# Patient Record
Sex: FEMALE | Race: WHITE
[De-identification: names, ages, dates, MRNs, and addresses within clinical notes are randomized per-mention and may not be internally consistent; named-entity substitution may affect disease eponyms.]

---

## 2019-01-07 ENCOUNTER — HOSPITAL ENCOUNTER (OUTPATIENT)
Dept: HOSPITAL 92 - SCSULT | Age: 74
Discharge: HOME | End: 2019-01-07
Attending: OTOLARYNGOLOGY
Payer: MEDICARE

## 2019-01-07 DIAGNOSIS — E04.9: ICD-10-CM

## 2019-01-07 DIAGNOSIS — E04.1: Primary | ICD-10-CM

## 2019-01-07 DIAGNOSIS — R53.83: ICD-10-CM

## 2019-01-07 PROCEDURE — 84443 ASSAY THYROID STIM HORMONE: CPT

## 2019-01-07 PROCEDURE — 76536 US EXAM OF HEAD AND NECK: CPT

## 2019-01-07 PROCEDURE — 36415 COLL VENOUS BLD VENIPUNCTURE: CPT

## 2019-01-07 NOTE — ULT
THYROID ULTRASOUND:

 

Date:  01/07/19 

 

CLINICAL HISTORY:  

Thyroid goiter. 

 

FINDINGS:

Left thyroid lobe is absent. The right thyroid lobe measures 3.4 cm in length. There is mild thickeni
ng of the isthmus at 4.0 mm. There is generalized heterogeneity of the thyroid parenchyma. A discrete
 nodule is not identified. 

 

IMPRESSION: 

1.  Evidence of thyroid goiter without a discrete nodule. 

2.  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 

 

 

POS: [facility]

## 2019-01-21 ENCOUNTER — HOSPITAL ENCOUNTER (OUTPATIENT)
Dept: HOSPITAL 92 - LABBT | Age: 74
Discharge: HOME | End: 2019-01-21
Attending: ORTHOPAEDIC SURGERY
Payer: MEDICARE

## 2019-01-21 DIAGNOSIS — Z01.818: Primary | ICD-10-CM

## 2019-01-21 DIAGNOSIS — M17.12: ICD-10-CM

## 2019-01-21 LAB
ANION GAP SERPL CALC-SCNC: 16 MMOL/L (ref 10–20)
APTT PPP: 31.9 SEC (ref 22.9–36.1)
BASOPHILS # BLD AUTO: 0.1 THOU/UL (ref 0–0.2)
BASOPHILS NFR BLD AUTO: 0.7 % (ref 0–1)
BUN SERPL-MCNC: 12 MG/DL (ref 9.8–20.1)
CALCIUM SERPL-MCNC: 9.6 MG/DL (ref 7.8–10.44)
CHLORIDE SERPL-SCNC: 93 MMOL/L (ref 98–107)
CO2 SERPL-SCNC: 29 MMOL/L (ref 23–31)
CREAT CL PREDICTED SERPL C-G-VRATE: 0 ML/MIN (ref 70–130)
CRYSTAL-AUWI FLAG: 0 (ref 0–15)
EOSINOPHIL # BLD AUTO: 0.1 THOU/UL (ref 0–0.7)
EOSINOPHIL NFR BLD AUTO: 1.6 % (ref 0–10)
GLUCOSE SERPL-MCNC: 104 MG/DL (ref 83–110)
HEV IGM SER QL: 0.8 (ref 0–7.99)
HGB BLD-MCNC: 12.1 G/DL (ref 12–16)
HYALINE CASTS #/AREA URNS LPF: (no result) LPF
INR PPP: 1
LYMPHOCYTES # BLD: 1.5 THOU/UL (ref 1.2–3.4)
LYMPHOCYTES NFR BLD AUTO: 16.4 % (ref 21–51)
MCH RBC QN AUTO: 28.1 PG (ref 27–31)
MCV RBC AUTO: 87 FL (ref 78–98)
MONOCYTES # BLD AUTO: 0.7 THOU/UL (ref 0.11–0.59)
MONOCYTES NFR BLD AUTO: 7.9 % (ref 0–10)
NEUTROPHILS # BLD AUTO: 6.7 THOU/UL (ref 1.4–6.5)
NEUTROPHILS NFR BLD AUTO: 73.4 % (ref 42–75)
PATHC CAST-AUWI FLAG: 0.29 (ref 0–2.49)
PLATELET # BLD AUTO: 357 THOU/UL (ref 130–400)
POTASSIUM SERPL-SCNC: 3.1 MMOL/L (ref 3.5–5.1)
PROTHROMBIN TIME: 13.2 SEC (ref 12–14.7)
RBC # BLD AUTO: 4.32 MILL/UL (ref 4.2–5.4)
RBC UR QL AUTO: (no result) HPF (ref 0–3)
SODIUM SERPL-SCNC: 135 MMOL/L (ref 136–145)
SP GR UR STRIP: 1.01 (ref 1–1.04)
SPERM-AUWI FLAG: 0 (ref 0–9.9)
WBC # BLD AUTO: 9.1 THOU/UL (ref 4.8–10.8)
YEAST-AUWI FLAG: 0 (ref 0–25)

## 2019-01-21 PROCEDURE — 85025 COMPLETE CBC W/AUTO DIFF WBC: CPT

## 2019-01-21 PROCEDURE — 85610 PROTHROMBIN TIME: CPT

## 2019-01-21 PROCEDURE — 87081 CULTURE SCREEN ONLY: CPT

## 2019-01-21 PROCEDURE — 80048 BASIC METABOLIC PNL TOTAL CA: CPT

## 2019-01-21 PROCEDURE — 93010 ELECTROCARDIOGRAM REPORT: CPT

## 2019-01-21 PROCEDURE — 85730 THROMBOPLASTIN TIME PARTIAL: CPT

## 2019-01-21 PROCEDURE — 93005 ELECTROCARDIOGRAM TRACING: CPT

## 2019-01-21 PROCEDURE — 71046 X-RAY EXAM CHEST 2 VIEWS: CPT

## 2019-01-21 PROCEDURE — 81001 URINALYSIS AUTO W/SCOPE: CPT

## 2019-01-21 NOTE — RAD
CHEST 2 VIEWS:

 

HISTORY: 

Preadmission radiograph.

 

COMPARISON: 

None.

 

FINDINGS: 

Normal cardiac silhouette.  Pulmonary vessels and hilum are normal.  Costophrenic angles are clear.  
No consolidation or mass.  No pneumothorax or osseous abnormalities.  Incidental hiatal hernia is not
ed.

 

IMPRESSION: 

No acute cardiopulmonary process.

 

POS: [facility]

## 2019-01-29 RX ADMIN — DOCUSATE SODIUM 50 MG AND SENNOSIDES 8.6 MG SCH TAB: 8.6; 5 TABLET, FILM COATED ORAL at 20:30

## 2019-01-29 RX ADMIN — ASPIRIN SCH MG: 81 TABLET ORAL at 20:30

## 2019-01-29 RX ADMIN — CEFAZOLIN SODIUM SCH MLS: 2 SOLUTION INTRAVENOUS at 17:15

## 2019-01-29 NOTE — PDOC.PN
- Subjective


Encounter Start Date: 01/29/19


Encounter Start Time: 19:10


Subjective: Pt seen and examined. chart reviewed.S/P Left TKR


-: IMteam consulted for medical management.PCP 


-: denies any pain,no nausea/vomiting.no fever/chills. no CP/SOB





- Objective


MAR Reviewed: Yes


Vital Signs & Weight: 


 Vital Signs (12 hours)











  Temp Pulse Resp BP BP Pulse Ox


 


 01/29/19 16:45       94 L


 


 01/29/19 16:05     107/69  


 


 01/29/19 16:00   85    130/82 


 


 01/29/19 15:30  97.6 F  91  18   164/81 H  96








 Weight











Weight                         185 lb














I&O: 


 











 01/28/19 01/29/19 01/30/19





 06:59 06:59 06:59


 


Intake Total   1360


 


Output Total   350


 


Balance   1010











Additional Labs: 





 Laboratory Tests











  01/07/19 01/21/19 01/21/19





  11:51 15:05 15:05


 


WBC    9.1


 


Hgb    12.1


 


Plt Count    357


 


Sodium   135 L 


 


Potassium   3.1 L 


 


Chloride   93 L 


 


Carbon Dioxide   29 


 


Anion Gap   16 


 


BUN   12 


 


TSH 3rd Generation  1.7040  














Phys Exam





- Physical Examination


Constitutional: NAD


HEENT: PERRLA, moist MMs, sclera anicteric, oral pharynx no lesions


Neck: no nodes, no JVD, supple, full ROM


Respiratory: no wheezing, no rales, no rhonchi, clear to auscultation bilateral


Cardiovascular: RRR, no significant murmur


Gastrointestinal: soft, non-tender, no distention, positive bowel sounds


Musculoskeletal: no edema, pulses present


Neurological: non-focal, normal sensation, moves all 4 limbs


Psychiatric: normal affect, A&O x 3


Skin: no rash





Dx/Plan


(1) HTN (hypertension)


Code(s): I10 - ESSENTIAL (PRIMARY) HYPERTENSION   Status: Chronic   





(2) Hypothyroid


Code(s): E03.9 - HYPOTHYROIDISM, UNSPECIFIED   Status: Chronic   





(3) Status post total knee replacement, left


Code(s): Z96.652 - PRESENCE OF LEFT ARTIFICIAL KNEE JOINT   Status: Acute   





- Plan


plan discussed w/ family, PT/OT, incentive spirometry, out of bed/ambulate, DVT 

proph w/SCDs


HD stable. cont home meds as below. reviewed.


-: ASA BID for DVT prophylaxis


-: add prn antihypertensives.Bp controlled for now


-: AM labs. monitor lytes,H/H,renal Fx etc


-: IM team will follow





* .








Review of Systems





- Review of Systems


Constitutional: negative: fever, chills, sweats, weakness, malaise, other


ENT: negative: Ear Pain, Ear Discharge, Nose Pain, Nose Discharge, Nose 

Congestion, Mouth Pain, Mouth Swelling, Throat Pain, Throat Swelling, Other


Respiratory: negative: Cough, Dry, Shortness of Breath, Hemoptysis, SOB with 

Excertion, Pleuritic Pain, Sputum, Wheezing


Cardiovascular: negative: chest pain, palpitations, orthopnea, paroxysmal 

nocturnal dyspnea, edema, light headedness, other


Gastrointestinal: negative: Nausea, Vomiting, Abdominal Pain, Diarrhea, 

Constipation, Melena, Hematochezia, Other


Genitourinary: negative: Dysuria, Frequency, Incontinence, Hematuria, Retention

, Other


Musculoskeletal: negative: Neck Pain, Shoulder Pain, Arm Pain, Back Pain, Hand 

Pain, Leg Pain, Foot Pain, Other


Skin: negative: Rash, Lesions, Anupam, Bruising, Other


Neurological: negative: Weakness, Numbness, Incoordination, Change in Speech, 

Confusion, Seizures, Other





- Medications/Allergies


Allergies/Adverse Reactions: 


 Allergies











Allergy/AdvReac Type Severity Reaction Status Date / Time


 


codeine Allergy Mild Headache Verified 01/29/19 16:42


 


Sulfa (Sulfonamide Allergy  Rash Verified 01/29/19 16:42





Antibiotics)     











Medications: 


 Current Medications





Acetaminophen (Tylenol)  1,000 mg PO Q6H PRN


   PRN Reason: Mild Pain (1-3)


Hydrocodone Bitart/Acetaminophen (Norco 10/325)  1 tab PO Q4H PRN


   PRN Reason: Pain (1-3)


Hydrocodone Bitart/Acetaminophen (Norco 10/325)  2 tab PO Q4H PRN


   PRN Reason: PAIN (4-6)


Aspirin (Ecotrin)  81 mg PO BID [facility]


Benzonatate (Tessalon)  100 mg PO Q6H PRN


   PRN Reason: Cough


Clonidine (Catapres)  0.1 mg PO Q4H PRN


   PRN Reason: SBP > 160____


Diphenhydramine HCl (Benadryl)  25 mg PO Q6H PRN


   PRN Reason: Itching


Fentanyl (Sublimaze)  50 mcg IV Q1H PRN


   PRN Reason: BREAKTHRU PAIN


Ferrous Gluconate (Fergon)  324 mg PO BID [facility]


Fluoxetine HCl (Prozac)  10 mg PO DAILY [facility]


Guaifenesin (Robitussin Sf)  200 mg PO Q4H PRN


   PRN Reason: Cough


Hydralazine HCl (Apresoline)  10 mg SLOW IVP Q4H PRN


   PRN Reason: SBP > 170 and HR < 70


Hydrochlorothiazide (Hydrochlorothiazide)  25 mg PO DAILY [facility]


Vancomycin HCl 1.5 gm/ Sodium (Chloride)  300 mls @ 200 mls/hr IVPB ONE [facility]


   Stop: 01/29/19 23:59


Sodium Chloride (Normal Saline 0.9%)  1,000 mls @ 100 mls/hr IV .Q10H [facility]


   Last Admin: 01/29/19 17:14 Dose:  1,000 mls


Ropivacaine 250 ml/ Device  250 mls @ 10 mls/hr NERVE BLCK INF [facility]


Cefazolin Sodium/Dextrose 2 gm (/ Device)  50 mls @ 100 mls/hr IVPB 0200,1000,

1800 [facility]


   Stop: 01/30/19 02:29


   Last Admin: 01/29/19 17:15 Dose:  50 mls


Iron/Minerals/Multivitamins (Theragran M)  1 tab PO DAILY [facility]


Ketorolac Tromethamine (Toradol)  15 mg IVP Q6HR [facility]


   Stop: 01/31/19 06:01


   Last Admin: 01/29/19 17:15 Dose:  15 mg


Levothyroxine Sodium (Synthroid)  112 mcg PO 0600 [facility]


Lisinopril (Zestril)  40 mg PO DAILY [facility]


Nitroglycerin (Nitrostat)  0.4 mg SL Q5MIN PRN


   PRN Reason: Chest Pain


Ondansetron HCl (Zofran)  4 mg IVP Q6H PRN


   PRN Reason: Nausea/Vomiting


Pantoprazole Sodium (Protonix)  40 mg PO HS [facility]


Promethazine HCl (Phenergan)  12.5 mg IM Q4H PRN


   PRN Reason: Nausea


Senna/Docusate Sodium (Senokot S)  2 tab PO BID [facility]


Sodium Chloride (Flush - Normal Saline)  10 ml IVF PRN PRN


   PRN Reason: Saline Flush


Sodium Chloride (Flush - Normal Saline)  10 ml IVF PRN PRN


   PRN Reason: Saline Flush


Tramadol HCl (Ultram)  50 mg PO Q6H PRN


   PRN Reason: Mild Pain (1-3)


Tramadol HCl (Ultram)  100 mg PO Q6H PRN


   PRN Reason: Moderate Pain 4-6


Trospium (Trospium)  20 mg PO DAILY [facility]


Zolpidem Tartrate (Ambien)  5 mg PO HSPRN PRN


   PRN Reason: Insomnia

## 2019-01-29 NOTE — OP
DATE OF PROCEDURE:  01/29/2019



PROCEDURE PERFORMED:  Left total knee arthroplasty using Shilo triathlon, 4 femur,

4 tibia, 9 mm CS X3 polyethylene, and a 29 patella. 



ASSISTANT:  Raúl Morin PA-C.



BLOOD LOSS:  Minimal.



SPECIMEN:  None.



DRAINS:  None.



COMPLICATIONS:  None.



TOURNIQUET TIME:  50 minutes.



PROCEDURE IN DETAIL:  After informed consent was obtained in the preoperative

holding area, the patient was taken to the operative suite where general anesthesia

was induced.  Once adequate level of general anesthesia was obtained, the patient

was positioned and a well-padded tourniquet was placed around the left proximal

thigh.  The left lower extremity was then prepped and draped in the usual sterile

fashion.  Prior to exsanguination, a time-out was called and all members of the

surgical team agreed upon site, surgeon, and patient.  The extremity was then

exsanguinated and the tourniquet was raised.  A midline longitudinal incision was

then made directly over the patella extending 2 fingerbreadths above the superior

pole of the patella and 2 fingerbreadths inferior to the inferior patellar pole of

the patella.  Deeper subcutaneous layers were dissected sharply and local bleeding

was controlled with Bovie electrocautery.  A quad tendon longitudinal split was then

made sharply and a median parapatellar arthrotomy was carried out both sharp and

with Bovie electrocautery, carried down to 1 fingerbreadth medial to the tibial

tubercle.  The knee was then placed into flexion and the patella was everted nicely,

and a copious fat pad ectomy was performed allowing for greater exposure of the

tibia.  The computer-assisted distal femoral fiducial was then placed and pinned

firmly, and the distal femoral cutting guide was pinned firmly into place.  The

oscillating saw was then used to remove the appropriate amount of bone.  The 4-in-1

cutting block was then placed on the distal femur and the oscillating saw was used

to remove the appropriate amount of bone off the anterior, posterior, and chamfer

cuts.  After completion of bone cuts, the anterior cruciate ligament was resected

sharply and the posterior cruciate ligament retractor was placed and the tibia was

subluxed for better exposure.  Partial meniscectomies were carried out, and the

tibial computer-assisted fiducial was pinned, and the cutting guide was placed.

Oscillating saw was then used to remove the bone, with Hohmann retractors used to

take care and protect the collateral ligaments.  After the tibial resection was

performed, a laminar  was placed in between the freshened bone cuts.  The

knee placed at 90 degrees and further bilateral meniscectomies were carried out, and

the curved osteotome and curettage were used to remove any excess bone spurs in the

posterior compartment.  The trial femoral component, tibial baseplate were placed

with the appropriate polyethylene trial insert with an appropriate polyethylene

spacer and patellar button.  The knee was taken through full range of motion with

flexion and extension from 0 to 90 degrees and patellar broach squarely in the

trochlea without any squinting or subluxation noted.  The knee was also stable to

varus and valgus stressing at 0, 15, 45, and 90 degrees of flexion.  The drawer was

negative.  All trial components were then removed and the keel punch was used to

provide the appropriate defect in the tibia with a mallet.  The freshened bone cuts

were copiously irrigated with pulsatile lavage of about 1.5 L to remove all excess

debris.  The freshened bone cuts were then dried with suction and lap sponge.  The

knee was placed in flexion and retractors were placed to provide access to all bone

cuts.  Tobramycin-impregnated methyl methacrylate cement was then placed on the

freshened bone cuts and implants which were malleted firmly into place.  Curettage

and Hagan elevators were used to remove any excess bone cement.  The knee was placed

into full extension and the patellar button was placed under compression, and the

cement was allowed to cure.  Once completed, the components were again taken through

full range of motion and copious irrigation of the knee was carried out with another

liter of normal saline.  All components were inspected fully with full range of

motion and varus and valgus stressing.  There was no laxity noted and full extension

was observed clinically.  Primary closure was accomplished with #2 interrupted

Vicryl stitch of the arthrotomy defect.  This was oversewn with a #2 running Quill

barbed stitch.  The gravitational platelet system was then injected into the

arthrotomy prior to closure.  The subcutaneous layer was then closed with a running

0 barbed Monocryl stitch and skin closure accomplished with a running subcuticular

3-0 Monocryl barbed Quill stitch and augmented with cement on the skin.  Tourniquet

was lowered.  Good spontaneous 

return of distal pulses was noted clinically and a sterile dressing was applied to

the incision.  The procedure was terminated without any complications.  The patient

was awakened in the operative suite and the patient was taken to the recovery room

in stable condition. 







Job ID:  [id number]

## 2019-01-29 NOTE — RAD
LEFT KNEE TWO VIEWS:

 

01/29/2019

 

HISTORY:

Postop total knee replacement.

 

FINDINGS:

Post surgical changes related to left total knee prosthesis are present.  No hardware complication is
 seen.  There is no fracture or dislocation identified.  Subcutaneous emphysema and edema are seen ab
out the knee, related to recent post surgical changes.

 

IMPRESSION:

Recent postsurgical changes related to placement of left total knee prosthesis.

 

POS: [facility]

## 2019-01-30 LAB
ANION GAP SERPL CALC-SCNC: 14 MMOL/L (ref 10–20)
BUN SERPL-MCNC: 12 MG/DL (ref 9.8–20.1)
CALCIUM SERPL-MCNC: 8 MG/DL (ref 7.8–10.44)
CHLORIDE SERPL-SCNC: 97 MMOL/L (ref 98–107)
CO2 SERPL-SCNC: 23 MMOL/L (ref 23–31)
CREAT CL PREDICTED SERPL C-G-VRATE: 82 ML/MIN (ref 70–130)
GLUCOSE SERPL-MCNC: 115 MG/DL (ref 83–110)
HGB BLD-MCNC: 10.2 G/DL (ref 12–16)
MCH RBC QN AUTO: 26.8 PG (ref 27–31)
MCV RBC AUTO: 86.4 FL (ref 78–98)
PLATELET # BLD AUTO: 316 THOU/UL (ref 130–400)
POTASSIUM SERPL-SCNC: 3.1 MMOL/L (ref 3.5–5.1)
RBC # BLD AUTO: 3.8 MILL/UL (ref 4.2–5.4)
SODIUM SERPL-SCNC: 131 MMOL/L (ref 136–145)
WBC # BLD AUTO: 10.9 THOU/UL (ref 4.8–10.8)

## 2019-01-30 RX ADMIN — ROPIVACAINE HYDROCHLORIDE SCH MLS: 2 INJECTION, SOLUTION EPIDURAL; INFILTRATION at 16:10

## 2019-01-30 RX ADMIN — MULTIPLE VITAMINS W/ MINERALS TAB SCH TAB: TAB at 08:55

## 2019-01-30 RX ADMIN — DOCUSATE SODIUM 50 MG AND SENNOSIDES 8.6 MG SCH TAB: 8.6; 5 TABLET, FILM COATED ORAL at 20:42

## 2019-01-30 RX ADMIN — POTASSIUM CHLORIDE SCH MLS: 14.9 INJECTION, SOLUTION INTRAVENOUS at 16:10

## 2019-01-30 RX ADMIN — ASPIRIN SCH MG: 81 TABLET ORAL at 08:55

## 2019-01-30 RX ADMIN — CEFAZOLIN SODIUM SCH MLS: 2 SOLUTION INTRAVENOUS at 01:14

## 2019-01-30 RX ADMIN — POTASSIUM CHLORIDE SCH MLS: 14.9 INJECTION, SOLUTION INTRAVENOUS at 10:36

## 2019-01-30 RX ADMIN — TROSPIUM CHLORIDE SCH MG: 20 TABLET, FILM COATED ORAL at 09:56

## 2019-01-30 RX ADMIN — DOCUSATE SODIUM 50 MG AND SENNOSIDES 8.6 MG SCH TAB: 8.6; 5 TABLET, FILM COATED ORAL at 08:55

## 2019-01-30 RX ADMIN — ASPIRIN SCH MG: 81 TABLET ORAL at 20:41

## 2019-01-30 NOTE — PDOC.PN
- Subjective


Encounter Start Date: 01/30/19


Encounter Start Time: 15:14


Subjective: mild nausea & pain at IV insertion site.IV blew twice 


-: O/W denies any othet complaints.pain controlled





- Objective


MAR Reviewed: Yes


Vital Signs & Weight: 


 Vital Signs (12 hours)











  Temp Pulse Resp BP BP BP Pulse Ox


 


 01/30/19 13:05  98.1 F  80  18    136/72  97


 


 01/30/19 08:55     148/83 H   


 


 01/30/19 07:52  97.7 F  83  20    148/83 H  98


 


 01/30/19 04:32  97.9 F  86  16   135/80   96








 Weight











Admit Weight                   185 lb


 


Weight                         185 lb














I&O: 


 











 01/29/19 01/30/19 01/31/19





 06:59 06:59 06:59


 


Intake Total  1360 100


 


Output Total  350 400


 


Balance  1010 -300











Result Diagrams: 


 01/30/19 05:37





 01/30/19 05:37


Additional Labs: 





 Laboratory Tests











  01/21/19 01/30/19





  15:05 05:37


 


Hgb  12.1  10.2 L














Phys Exam





- Physical Examination


Constitutional: NAD


HEENT: PERRLA, moist MMs, sclera anicteric, oral pharynx no lesions


Neck: no nodes, no JVD, supple, full ROM


Respiratory: no wheezing, no rales, no rhonchi, clear to auscultation bilateral


Cardiovascular: RRR, no significant murmur, no rub


Gastrointestinal: soft, non-tender, no distention, positive bowel sounds


Musculoskeletal: no edema, pulses present


Neurological: non-focal, normal sensation, moves all 4 limbs


Psychiatric: normal affect, A&O x 3


Skin: no rash





Dx/Plan


(1) Hypokalemia


Code(s): E87.6 - HYPOKALEMIA   Status: Acute   





(2) HTN (hypertension)


Code(s): I10 - ESSENTIAL (PRIMARY) HYPERTENSION   Status: Chronic   





(3) Hypothyroid


Code(s): E03.9 - HYPOTHYROIDISM, UNSPECIFIED   Status: Chronic   





(4) Status post total knee replacement, left


Code(s): Z96.652 - PRESENCE OF LEFT ARTIFICIAL KNEE JOINT   Status: Acute   





- Plan


PT/OT, incentive spirometry, out of bed/ambulate, DVT proph w/SCDs


Hemodynamically stable


-: H/H lower post-op.asymptomatic. monitor


-: replace Potassium


-: Pt requesting lad holiday tomorrow d/t IV pain


-: Im team will follow. Home meds as below.OT/PT





* .








Review of Systems





- Review of Systems


Constitutional: weakness.  negative: fever, chills, sweats, malaise, other


ENT: negative: Ear Pain, Ear Discharge, Nose Pain, Nose Discharge, Nose 

Congestion, Mouth Pain, Mouth Swelling, Throat Pain, Throat Swelling, Other


Respiratory: negative: Cough, Dry, Shortness of Breath, Hemoptysis, SOB with 

Excertion, Pleuritic Pain, Sputum, Wheezing


Cardiovascular: negative: chest pain, palpitations, orthopnea, paroxysmal 

nocturnal dyspnea, edema, light headedness, other


Gastrointestinal: Nausea


Genitourinary: negative: Dysuria, Frequency, Incontinence, Hematuria, Retention

, Other


Musculoskeletal: negative: Neck Pain, Shoulder Pain, Arm Pain, Back Pain, Hand 

Pain, Leg Pain, Foot Pain, Other


Neurological: negative: Weakness, Numbness, Incoordination, Change in Speech, 

Confusion, Seizures, Other





- Medications/Allergies


Allergies/Adverse Reactions: 


 Allergies











Allergy/AdvReac Type Severity Reaction Status Date / Time


 


codeine Allergy Mild Headache Verified 01/29/19 16:42


 


Sulfa (Sulfonamide Allergy  Rash Verified 01/29/19 16:42





Antibiotics)     











Medications: 


 Current Medications





Acetaminophen (Tylenol)  1,000 mg PO Q6H PRN


   PRN Reason: Mild Pain (1-3)


Hydrocodone Bitart/Acetaminophen (Norco 10/325)  1 tab PO Q4H PRN


   PRN Reason: Pain (1-3)


Hydrocodone Bitart/Acetaminophen (Norco 10/325)  2 tab PO Q4H PRN


   PRN Reason: PAIN (4-6)


Aspirin (Ecotrin)  81 mg PO BID [facility]


   Last Admin: 01/30/19 08:55 Dose:  81 mg


Benzonatate (Tessalon)  100 mg PO Q6H PRN


   PRN Reason: Cough


Clonidine (Catapres)  0.1 mg PO Q4H PRN


   PRN Reason: SBP > 160____


Diphenhydramine HCl (Benadryl)  25 mg PO Q6H PRN


   PRN Reason: Itching


Fentanyl (Sublimaze)  50 mcg IV Q1H PRN


   PRN Reason: BREAKTHRU PAIN


Ferrous Gluconate (Fergon)  324 mg PO BID [facility]


   Last Admin: 01/30/19 08:55 Dose:  324 mg


Fluoxetine HCl (Prozac)  10 mg PO DAILY [facility]


   Last Admin: 01/30/19 09:56 Dose:  10 mg


Guaifenesin (Robitussin Sf)  200 mg PO Q4H PRN


   PRN Reason: Cough


Hydralazine HCl (Apresoline)  10 mg SLOW IVP Q4H PRN


   PRN Reason: SBP > 170 and HR < 70


Hydrochlorothiazide (Hydrochlorothiazide)  25 mg PO DAILY [facility]


   Last Admin: 01/30/19 08:55 Dose:  25 mg


Sodium Chloride (Normal Saline 0.9%)  1,000 mls @ 100 mls/hr IV .Q10H [facility]


   Last Admin: 01/30/19 07:54 Dose:  Not Given


Ropivacaine 250 ml/ Device  250 mls @ 10 mls/hr NERVE BLCK INF [facility]


Iron/Minerals/Multivitamins (Theragran M)  1 tab PO DAILY [facility]


   Last Admin: 01/30/19 08:55 Dose:  1 tab


Ketorolac Tromethamine (Toradol)  15 mg IVP Q6HR [facility]


   Stop: 01/31/19 06:01


   Last Admin: 01/30/19 13:15 Dose:  15 mg


Levothyroxine Sodium (Synthroid)  112 mcg PO 0600 [facility]


   Last Admin: 01/30/19 06:43 Dose:  112 mcg


Lisinopril (Zestril)  40 mg PO DAILY [facility]


   Last Admin: 01/30/19 08:55 Dose:  40 mg


Nitroglycerin (Nitrostat)  0.4 mg SL Q5MIN PRN


   PRN Reason: Chest Pain


Ondansetron HCl (Zofran)  4 mg IVP Q6H PRN


   PRN Reason: Nausea/Vomiting


Pantoprazole Sodium (Protonix)  40 mg PO HS [facility]


Promethazine HCl (Phenergan)  12.5 mg IM Q4H PRN


   PRN Reason: Nausea


Senna/Docusate Sodium (Senokot S)  2 tab PO BID [facility]


   Last Admin: 01/30/19 08:55 Dose:  2 tab


Sodium Chloride (Flush - Normal Saline)  10 ml IVF PRN PRN


   PRN Reason: Saline Flush


Sodium Chloride (Flush - Normal Saline)  10 ml IVF PRN PRN


   PRN Reason: Saline Flush


Tramadol HCl (Ultram)  50 mg PO Q6H PRN


   PRN Reason: Mild Pain (1-3)


Tramadol HCl (Ultram)  100 mg PO Q6H PRN


   PRN Reason: Moderate Pain 4-6


Trospium (Trospium)  20 mg PO DAILY [facility]


   Last Admin: 01/30/19 09:56 Dose:  20 mg


Zolpidem Tartrate (Ambien)  5 mg PO HSPRN PRN


   PRN Reason: Insomnia

## 2019-01-31 ENCOUNTER — HOSPITAL ENCOUNTER (OUTPATIENT)
Dept: HOSPITAL 92 - SDC | Age: 74
LOS: 1 days | Discharge: HOME | End: 2019-02-01
Attending: ORTHOPAEDIC SURGERY
Payer: MEDICARE

## 2019-01-31 VITALS — BODY MASS INDEX: 37.3 KG/M2

## 2019-01-31 DIAGNOSIS — E87.6: ICD-10-CM

## 2019-01-31 DIAGNOSIS — Z90.722: ICD-10-CM

## 2019-01-31 DIAGNOSIS — Z79.82: ICD-10-CM

## 2019-01-31 DIAGNOSIS — K21.9: ICD-10-CM

## 2019-01-31 DIAGNOSIS — Z88.2: ICD-10-CM

## 2019-01-31 DIAGNOSIS — Z90.710: ICD-10-CM

## 2019-01-31 DIAGNOSIS — E66.9: ICD-10-CM

## 2019-01-31 DIAGNOSIS — F41.9: ICD-10-CM

## 2019-01-31 DIAGNOSIS — F32.9: ICD-10-CM

## 2019-01-31 DIAGNOSIS — M17.12: Primary | ICD-10-CM

## 2019-01-31 DIAGNOSIS — K58.9: ICD-10-CM

## 2019-01-31 DIAGNOSIS — Z98.890: ICD-10-CM

## 2019-01-31 DIAGNOSIS — Z79.899: ICD-10-CM

## 2019-01-31 DIAGNOSIS — E03.9: ICD-10-CM

## 2019-01-31 DIAGNOSIS — Z90.49: ICD-10-CM

## 2019-01-31 DIAGNOSIS — I10: ICD-10-CM

## 2019-01-31 DIAGNOSIS — Z88.5: ICD-10-CM

## 2019-01-31 PROCEDURE — 20985 CPTR-ASST DIR MS PX: CPT

## 2019-01-31 PROCEDURE — 97116 GAIT TRAINING THERAPY: CPT

## 2019-01-31 PROCEDURE — 0SRD0J9 REPLACEMENT OF LEFT KNEE JOINT WITH SYNTHETIC SUBSTITUTE, CEMENTED, OPEN APPROACH: ICD-10-PCS | Performed by: ORTHOPAEDIC SURGERY

## 2019-01-31 PROCEDURE — 98961 EDU&TRN PT SLF-MGMT NQHP 2-4: CPT

## 2019-01-31 PROCEDURE — 97110 THERAPEUTIC EXERCISES: CPT

## 2019-01-31 PROCEDURE — C1776 JOINT DEVICE (IMPLANTABLE): HCPCS

## 2019-01-31 PROCEDURE — 8E0YXBZ COMPUTER ASSISTED PROCEDURE OF LOWER EXTREMITY: ICD-10-PCS | Performed by: ORTHOPAEDIC SURGERY

## 2019-01-31 PROCEDURE — C1713 ANCHOR/SCREW BN/BN,TIS/BN: HCPCS

## 2019-01-31 PROCEDURE — 36415 COLL VENOUS BLD VENIPUNCTURE: CPT

## 2019-01-31 PROCEDURE — 97530 THERAPEUTIC ACTIVITIES: CPT

## 2019-01-31 PROCEDURE — 85027 COMPLETE CBC AUTOMATED: CPT

## 2019-01-31 PROCEDURE — 80048 BASIC METABOLIC PNL TOTAL CA: CPT

## 2019-01-31 PROCEDURE — 85025 COMPLETE CBC W/AUTO DIFF WBC: CPT

## 2019-01-31 PROCEDURE — 73560 X-RAY EXAM OF KNEE 1 OR 2: CPT

## 2019-01-31 PROCEDURE — 27447 TOTAL KNEE ARTHROPLASTY: CPT

## 2019-01-31 PROCEDURE — 97139 UNLISTED THERAPEUTIC PX: CPT

## 2019-01-31 PROCEDURE — 97150 GROUP THERAPEUTIC PROCEDURES: CPT

## 2019-01-31 RX ADMIN — DOCUSATE SODIUM 50 MG AND SENNOSIDES 8.6 MG SCH: 8.6; 5 TABLET, FILM COATED ORAL at 22:01

## 2019-01-31 RX ADMIN — ASPIRIN SCH MG: 81 TABLET ORAL at 22:00

## 2019-01-31 RX ADMIN — TROSPIUM CHLORIDE SCH MG: 20 TABLET, FILM COATED ORAL at 11:38

## 2019-01-31 RX ADMIN — ASPIRIN SCH MG: 81 TABLET ORAL at 09:52

## 2019-01-31 RX ADMIN — MULTIPLE VITAMINS W/ MINERALS TAB SCH TAB: TAB at 09:52

## 2019-01-31 RX ADMIN — ROPIVACAINE HYDROCHLORIDE SCH MLS: 2 INJECTION, SOLUTION EPIDURAL; INFILTRATION at 18:57

## 2019-01-31 RX ADMIN — DOCUSATE SODIUM 50 MG AND SENNOSIDES 8.6 MG SCH: 8.6; 5 TABLET, FILM COATED ORAL at 09:54

## 2019-01-31 NOTE — PDOC.PN
- Subjective


Encounter Start Date: 01/31/19


Encounter Start Time: 14:04


Subjective: had episode of dizziness and SOB earlier this Morning walking to [place]


-: better now





- Objective


MAR Reviewed: Yes


Vital Signs & Weight: 


 Vital Signs (12 hours)











  Temp Pulse Resp BP BP BP Pulse Ox


 


 01/31/19 11:56  97.2 F L  78  16    139/82  95


 


 01/31/19 11:36     139/82   


 


 01/31/19 09:20       


 


 01/31/19 07:28  98 F  86  20   139/82   94 L


 


 01/31/19 04:34  98.2 F   20    118/57 L 


 


 01/31/19 03:30        96














  Pulse Ox


 


 01/31/19 11:56 


 


 01/31/19 11:36 


 


 01/31/19 09:20  96


 


 01/31/19 07:28 


 


 01/31/19 04:34 


 


 01/31/19 03:30 








 Weight











Admit Weight                   185 lb


 


Weight                         185 lb














I&O: 


 











 01/30/19 01/31/19 02/01/19





 06:59 06:59 06:59


 


Intake Total 1360 2220 500


 


Output Total 350 400 


 


Balance 1010 1820 500











Result Diagrams: 


 01/30/19 05:37





 01/30/19 05:37





Phys Exam





- Physical Examination


Constitutional: NAD


sitting up in chair


HEENT: PERRLA, moist MMs, sclera anicteric, oral pharynx no lesions


Neck: no nodes, no JVD, supple, full ROM


Respiratory: no wheezing, no rales, no rhonchi


Cardiovascular: RRR, no significant murmur


Gastrointestinal: soft, non-tender, no distention, positive bowel sounds


Musculoskeletal: no edema, pulses present


Neurological: non-focal, normal sensation, moves all 4 limbs


Psychiatric: normal affect, A&O x 3


Skin: no rash





Dx/Plan


(1) Hypokalemia


Code(s): E87.6 - HYPOKALEMIA   Status: Acute   Comment: recheck in am   





(2) HTN (hypertension)


Code(s): I10 - ESSENTIAL (PRIMARY) HYPERTENSION   Status: Chronic   





(3) Hypothyroid


Code(s): E03.9 - HYPOTHYROIDISM, UNSPECIFIED   Status: Chronic   





(4) Status post total knee replacement, left


Code(s): Z96.652 - PRESENCE OF LEFT ARTIFICIAL KNEE JOINT   Status: Acute   





- Plan


DVT proph w/SCDs


BP on lower side.Hold HCTZ.Add parameters on lisinopril


-: jaciel vasovagal


-: will check H/H tomorrow.Cont FeSO4 BID.


-: ASA BID for dvt prophylaxis.


-: IM team will follow.





* .








Review of Systems





- Review of Systems


Constitutional: weakness.  negative: fever, chills, sweats, malaise, other


Respiratory: negative: Cough, Dry, Shortness of Breath, Hemoptysis, SOB with 

Excertion, Pleuritic Pain, Sputum, Wheezing


Cardiovascular: negative: chest pain, palpitations, orthopnea, paroxysmal 

nocturnal dyspnea, edema, light headedness, other


Gastrointestinal: negative: Nausea, Vomiting, Abdominal Pain, Diarrhea, 

Constipation, Melena, Hematochezia, Other


Genitourinary: negative: Dysuria, Frequency, Incontinence, Hematuria, Retention

, Other


Musculoskeletal: negative: Neck Pain, Shoulder Pain, Arm Pain, Back Pain, Hand 

Pain, Leg Pain, Foot Pain, Other





- Medications/Allergies


Allergies/Adverse Reactions: 


 Allergies











Allergy/AdvReac Type Severity Reaction Status Date / Time


 


codeine Allergy Mild Headache Verified 01/29/19 16:42


 


Sulfa (Sulfonamide Allergy  Rash Verified 01/29/19 16:42





Antibiotics)     











Medications: 


 Current Medications





Acetaminophen (Tylenol)  1,000 mg PO Q6H PRN


   PRN Reason: Mild Pain (1-3)


Hydrocodone Bitart/Acetaminophen (Norco 10/325)  1 tab PO Q4H PRN


   PRN Reason: Pain (1-3)


Hydrocodone Bitart/Acetaminophen (Norco 10/325)  2 tab PO Q4H PRN


   PRN Reason: PAIN (4-6)


Aspirin (Ecotrin)  81 mg PO BID [facility]


   Last Admin: 01/31/19 09:52 Dose:  81 mg


Benzonatate (Tessalon)  100 mg PO Q6H PRN


   PRN Reason: Cough


Clonidine (Catapres)  0.1 mg PO Q4H PRN


   PRN Reason: SBP > 160____


Diphenhydramine HCl (Benadryl)  25 mg PO Q6H PRN


   PRN Reason: Itching


Fentanyl (Sublimaze)  50 mcg IV Q1H PRN


   PRN Reason: BREAKTHRU PAIN


Ferrous Gluconate (Fergon)  324 mg PO BID [facility]


   Last Admin: 01/31/19 09:52 Dose:  324 mg


Fluoxetine HCl (Prozac)  10 mg PO DAILY [facility]


   Last Admin: 01/31/19 11:37 Dose:  10 mg


Guaifenesin (Robitussin Sf)  200 mg PO Q4H PRN


   PRN Reason: Cough


Hydralazine HCl (Apresoline)  10 mg SLOW IVP Q4H PRN


   PRN Reason: SBP > 170 and HR < 70


Sodium Chloride (Normal Saline 0.9%)  1,000 mls @ 100 mls/hr IV .Q10H [facility]


   Last Admin: 01/31/19 08:35 Dose:  Not Given


Ropivacaine 250 ml/ Device  250 mls @ 10 mls/hr NERVE BLCK INF [facility]


   Last Admin: 01/30/19 16:10 Dose:  250 mls


Iron/Minerals/Multivitamins (Theragran M)  1 tab PO DAILY [facility]


   Last Admin: 01/31/19 09:52 Dose:  1 tab


Levothyroxine Sodium (Synthroid)  112 mcg PO 0600 [facility]


   Last Admin: 01/31/19 06:34 Dose:  112 mcg


Lisinopril (Zestril)  40 mg PO DAILY [facility]


Nitroglycerin (Nitrostat)  0.4 mg SL Q5MIN PRN


   PRN Reason: Chest Pain


Ondansetron HCl (Zofran)  4 mg IVP Q6H PRN


   PRN Reason: Nausea/Vomiting


   Last Admin: 01/30/19 23:11 Dose:  4 mg


Pantoprazole Sodium (Protonix)  40 mg PO HS [facility]


   Last Admin: 01/30/19 20:42 Dose:  40 mg


Promethazine HCl (Phenergan)  12.5 mg IM Q4H PRN


   PRN Reason: Nausea


Senna/Docusate Sodium (Senokot S)  2 tab PO BID [facility]


   Last Admin: 01/31/19 09:54 Dose:  Not Given


Sodium Chloride (Flush - Normal Saline)  10 ml IVF PRN PRN


   PRN Reason: Saline Flush


Sodium Chloride (Flush - Normal Saline)  10 ml IVF PRN PRN


   PRN Reason: Saline Flush


Tramadol HCl (Ultram)  50 mg PO Q6H PRN


   PRN Reason: Mild Pain (1-3)


Tramadol HCl (Ultram)  100 mg PO Q6H PRN


   PRN Reason: Moderate Pain 4-6


Trospium (Trospium)  20 mg PO DAILY [facility]


   Last Admin: 01/31/19 11:38 Dose:  20 mg


Zolpidem Tartrate (Ambien)  5 mg PO HSPRN PRN


   PRN Reason: Insomnia

## 2019-02-01 VITALS — DIASTOLIC BLOOD PRESSURE: 85 MMHG | SYSTOLIC BLOOD PRESSURE: 148 MMHG | TEMPERATURE: 98.1 F

## 2019-02-01 LAB
ANION GAP SERPL CALC-SCNC: 13 MMOL/L (ref 10–20)
BASOPHILS # BLD AUTO: 0 THOU/UL (ref 0–0.2)
BASOPHILS NFR BLD AUTO: 0.5 % (ref 0–1)
BUN SERPL-MCNC: 8 MG/DL (ref 9.8–20.1)
CALCIUM SERPL-MCNC: 8.3 MG/DL (ref 7.8–10.44)
CHLORIDE SERPL-SCNC: 97 MMOL/L (ref 98–107)
CO2 SERPL-SCNC: 25 MMOL/L (ref 23–31)
CREAT CL PREDICTED SERPL C-G-VRATE: 92 ML/MIN (ref 70–130)
EOSINOPHIL # BLD AUTO: 0.3 THOU/UL (ref 0–0.7)
EOSINOPHIL NFR BLD AUTO: 3.7 % (ref 0–10)
GLUCOSE SERPL-MCNC: 91 MG/DL (ref 83–110)
HGB BLD-MCNC: 9.8 G/DL (ref 12–16)
LYMPHOCYTES # BLD: 1.2 THOU/UL (ref 1.2–3.4)
LYMPHOCYTES NFR BLD AUTO: 12.6 % (ref 21–51)
MCH RBC QN AUTO: 28.4 PG (ref 27–31)
MCV RBC AUTO: 88.6 FL (ref 78–98)
MONOCYTES # BLD AUTO: 1.1 THOU/UL (ref 0.11–0.59)
MONOCYTES NFR BLD AUTO: 11.2 % (ref 0–10)
NEUTROPHILS # BLD AUTO: 6.8 THOU/UL (ref 1.4–6.5)
NEUTROPHILS NFR BLD AUTO: 72.1 % (ref 42–75)
PLATELET # BLD AUTO: 263 THOU/UL (ref 130–400)
POTASSIUM SERPL-SCNC: 3.4 MMOL/L (ref 3.5–5.1)
RBC # BLD AUTO: 3.44 MILL/UL (ref 4.2–5.4)
SODIUM SERPL-SCNC: 132 MMOL/L (ref 136–145)
WBC # BLD AUTO: 9.4 THOU/UL (ref 4.8–10.8)

## 2019-02-01 RX ADMIN — ASPIRIN SCH MG: 81 TABLET ORAL at 08:57

## 2019-02-01 RX ADMIN — MULTIPLE VITAMINS W/ MINERALS TAB SCH TAB: TAB at 08:57

## 2019-02-01 RX ADMIN — DOCUSATE SODIUM 50 MG AND SENNOSIDES 8.6 MG SCH TAB: 8.6; 5 TABLET, FILM COATED ORAL at 08:56

## 2019-02-01 NOTE — PDOC.PN
- Subjective


Encounter Start Date: 02/01/19


Encounter Start Time: 13:43


Subjective: pt seen and examined.


-: feels a little dizzy but better.no ON events





- Objective


MAR Reviewed: Yes


Vital Signs & Weight: 


 Vital Signs (12 hours)











  Temp Pulse Resp BP BP Pulse Ox


 


 02/01/19 11:46  98.1 F  85  20   148/85 H  96


 


 02/01/19 08:56     160/86 H  


 


 02/01/19 08:00  97.9 F  98  16   160/86 H  95


 


 02/01/19 04:27  98.3 F  84  18   162/82 H  94 L








 Weight











Admit Weight                   185 lb


 


Weight                         185 lb














I&O: 


 











 01/31/19 02/01/19 02/02/19





 06:59 06:59 06:59


 


Intake Total 2220 1220 


 


Output Total 400  


 


Balance 1820 1220 











Result Diagrams: 


 02/01/19 06:18





 02/01/19 06:18





Phys Exam





- Physical Examination


Constitutional: NAD


HEENT: PERRLA, moist MMs, sclera anicteric, oral pharynx no lesions


Neck: no nodes, no JVD, supple, full ROM


Respiratory: no wheezing, no rales, no rhonchi, clear to auscultation bilateral


Cardiovascular: RRR, no significant murmur


Gastrointestinal: soft, non-tender, no distention, positive bowel sounds


Musculoskeletal: no edema, pulses present


Neurological: non-focal, normal sensation, moves all 4 limbs


Psychiatric: normal affect, A&O x 3





Dx/Plan


(1) Hypokalemia


Code(s): E87.6 - HYPOKALEMIA   Status: Acute   Comment: recheck as an OP.Pt 

educated   





(2) HTN (hypertension)


Code(s): I10 - ESSENTIAL (PRIMARY) HYPERTENSION   Status: Chronic   





(3) Hypothyroid


Code(s): E03.9 - HYPOTHYROIDISM, UNSPECIFIED   Status: Chronic   





(4) Status post total knee replacement, left


Code(s): Z96.652 - PRESENCE OF LEFT ARTIFICIAL KNEE JOINT   Status: Acute   





- Plan


OK to DC


-: HD stable





* .








Review of Systems





- Review of Systems


Constitutional: weakness.  negative: fever, chills, sweats, malaise, other


ENT: negative: Ear Pain, Ear Discharge, Nose Pain, Nose Discharge, Nose 

Congestion, Mouth Pain, Mouth Swelling, Throat Pain, Throat Swelling, Other


Respiratory: negative: Cough, Dry, Shortness of Breath, Hemoptysis, SOB with 

Excertion, Pleuritic Pain, Sputum, Wheezing


Cardiovascular: negative: chest pain, palpitations, orthopnea, paroxysmal 

nocturnal dyspnea, edema, light headedness, other


Gastrointestinal: negative: Nausea, Vomiting, Abdominal Pain, Diarrhea, 

Constipation, Melena, Hematochezia, Other


Genitourinary: negative: Dysuria, Frequency, Incontinence, Hematuria, Retention

, Other


Neurological: negative: Weakness, Numbness, Incoordination, Change in Speech, 

Confusion, Seizures, Other





- Medications/Allergies


Allergies/Adverse Reactions: 


 Allergies











Allergy/AdvReac Type Severity Reaction Status Date / Time


 


codeine Allergy Mild Headache Verified 01/29/19 16:42


 


Sulfa (Sulfonamide Allergy  Rash Verified 01/29/19 16:42





Antibiotics)

## 2019-06-07 ENCOUNTER — HOSPITAL ENCOUNTER (OUTPATIENT)
Dept: HOSPITAL 92 - SCSULT | Age: 74
Discharge: HOME | End: 2019-06-07
Attending: UROLOGY
Payer: MEDICARE

## 2019-06-07 DIAGNOSIS — N39.0: Primary | ICD-10-CM

## 2019-06-07 DIAGNOSIS — R39.15: ICD-10-CM

## 2019-06-07 DIAGNOSIS — K76.0: ICD-10-CM

## 2019-06-07 PROCEDURE — 76770 US EXAM ABDO BACK WALL COMP: CPT

## 2019-06-07 NOTE — ULT
US Renal Bilateral STANDARD



History: [Urinary urgency]



Comparison: Ultrasound 2011



Findings: Real-time grayscale and color evaluation of the kidneys and urinary bladder was performed.



Right kidney measures 10.8 x 4.6 x 4.7 cm and the left kidney measures 10.2 x 4.6 x 5 cm. No renal ma
ss, hydronephrosis, or abnormal calcifications. Urinary bladder is are unremarkable.



Diffuse hepatic steatosis.





Prevoid urinary bladder volume is 68 mL in post void urinary bladder volume is 6.4 mL area





Impression: No renal mass, hydronephrosis, or abnormal calcifications. No evidence for obstructive ur
opathy.



Diffuse hepatic steatosis.



Reported By: Chris Leblanc 

Electronically Signed:  6/7/2019 2:29 PM

## 2020-09-24 ENCOUNTER — HOSPITAL ENCOUNTER (OUTPATIENT)
Dept: HOSPITAL 92 - SCSCT | Age: 75
Discharge: HOME | End: 2020-09-24
Attending: INTERNAL MEDICINE
Payer: MEDICARE

## 2020-09-24 DIAGNOSIS — R91.8: Primary | ICD-10-CM

## 2020-09-24 DIAGNOSIS — K44.9: ICD-10-CM

## 2020-09-24 LAB — ESTIMATED GFR-MDRD - POC: 70

## 2020-09-24 PROCEDURE — 82565 ASSAY OF CREATININE: CPT

## 2020-09-24 PROCEDURE — 71260 CT THORAX DX C+: CPT

## 2020-09-29 NOTE — CT
Exam: Chest CT with contrast



HISTORY: Shortness of breath, hypertension. Lung mass.



COMPARISON: None

Correlation: Chest radiograph 1/21/2019



FINDINGS:

Mediastinum: Nonenlarged mediastinal lymph nodes. Subcarinal lymph node measures 1.3 x 0.9 cm.



Heart: No evidence of cardiomegaly, coronary calcifications are pericardial fluid

Aorta: No evidence of aneurysm, dissection or periaortic fat stranding

Lower neck and axilla: Left thyroid lobe appears to be surgically absent. Nonspecific bilateral axill
amanda lymphadenopathy

Subdiaphragmatic structures: Hepatic steatosis. Moderate hiatal hernia

Osseous structures: No lytic or blastic lesions within the osseous structures.

Trachea and central bronchi: Patent

Pleural spaces: No pleural effusion

Pneumothorax: None



LUNGS: Minimal dependent atelectatic changes

Nodules: No suspicious masses or nodules in the right or left lung.



IMPRESSION:

1. No evidence of a suspicious masses or nodules in the left or right lung.

2. Moderate hiatal hernia projecting posterior to the left atrium and left ventricle.

Results study discussed with Dr. Negrete 9/29/2020 at 1:48 PM



Reported By: Nelida Colmenares 

Electronically Signed:  9/29/2020 1:49 PM

## 2021-03-01 ENCOUNTER — HOSPITAL ENCOUNTER (OUTPATIENT)
Dept: HOSPITAL 92 - BICRAD | Age: 76
Discharge: HOME | End: 2021-03-01
Attending: INTERNAL MEDICINE
Payer: MEDICARE

## 2021-03-01 DIAGNOSIS — R06.00: Primary | ICD-10-CM

## 2021-03-01 PROCEDURE — 71046 X-RAY EXAM CHEST 2 VIEWS: CPT

## 2021-03-11 ENCOUNTER — HOSPITAL ENCOUNTER (OUTPATIENT)
Dept: HOSPITAL 92 - BICULT | Age: 76
Discharge: HOME | End: 2021-03-11
Attending: INTERNAL MEDICINE
Payer: MEDICARE

## 2021-03-11 DIAGNOSIS — I10: Primary | ICD-10-CM

## 2021-03-11 PROCEDURE — 76770 US EXAM ABDO BACK WALL COMP: CPT

## 2021-03-11 PROCEDURE — 93975 VASCULAR STUDY: CPT

## 2021-03-12 ENCOUNTER — HOSPITAL ENCOUNTER (OUTPATIENT)
Dept: HOSPITAL 92 - SLEEPLAB | Age: 76
Discharge: HOME | End: 2021-03-12
Attending: INTERNAL MEDICINE
Payer: MEDICARE

## 2021-03-12 DIAGNOSIS — R06.83: ICD-10-CM

## 2021-03-12 DIAGNOSIS — R53.83: ICD-10-CM

## 2021-03-12 DIAGNOSIS — G47.33: Primary | ICD-10-CM

## 2021-03-12 DIAGNOSIS — I10: ICD-10-CM

## 2021-03-12 DIAGNOSIS — R09.02: ICD-10-CM

## 2021-03-12 DIAGNOSIS — G47.00: ICD-10-CM

## 2021-03-12 PROCEDURE — 95806 SLEEP STUDY UNATT&RESP EFFT: CPT

## 2021-03-30 ENCOUNTER — HOSPITAL ENCOUNTER (EMERGENCY)
Dept: HOSPITAL 92 - CSHERS | Age: 76
Discharge: HOME | End: 2021-03-30
Payer: MEDICARE

## 2021-03-30 DIAGNOSIS — I10: ICD-10-CM

## 2021-03-30 DIAGNOSIS — E66.9: ICD-10-CM

## 2021-03-30 DIAGNOSIS — K35.80: Primary | ICD-10-CM

## 2021-03-30 DIAGNOSIS — K21.9: ICD-10-CM

## 2021-03-30 DIAGNOSIS — Z79.899: ICD-10-CM

## 2021-03-30 LAB
ALBUMIN SERPL BCG-MCNC: 4.1 G/DL (ref 3.4–4.8)
ALP SERPL-CCNC: 80 U/L (ref 40–110)
ALT SERPL W P-5'-P-CCNC: 39 U/L (ref 8–55)
ANION GAP SERPL CALC-SCNC: 16 MMOL/L (ref 10–20)
AST SERPL-CCNC: 31 U/L (ref 5–34)
BACTERIA UR QL AUTO: (no result) HPF
BASOPHILS # BLD AUTO: 0 10X3/UL (ref 0–0.2)
BASOPHILS NFR BLD AUTO: 0.3 % (ref 0–2)
BILIRUB SERPL-MCNC: 1.7 MG/DL (ref 0.2–1.2)
BUN SERPL-MCNC: 9 MG/DL (ref 9.8–20.1)
CALCIUM SERPL-MCNC: 8.9 MG/DL (ref 7.8–10.44)
CHLORIDE SERPL-SCNC: 96 MMOL/L (ref 98–107)
CO2 SERPL-SCNC: 23 MMOL/L (ref 23–31)
CREAT CL PREDICTED SERPL C-G-VRATE: 0 ML/MIN (ref 70–130)
EOSINOPHIL # BLD AUTO: 0 10X3/UL (ref 0–0.5)
EOSINOPHIL NFR BLD AUTO: 0.1 % (ref 0–6)
GLOBULIN SER CALC-MCNC: 3 G/DL (ref 2.4–3.5)
GLUCOSE SERPL-MCNC: 104 MG/DL (ref 83–110)
HGB BLD-MCNC: 14.2 G/DL (ref 12–15.5)
LEUKOCYTE ESTERASE UR QL STRIP.AUTO: 500
LIPASE SERPL-CCNC: 17 U/L (ref 8–78)
LYMPHOCYTES NFR BLD AUTO: 5.2 % (ref 18–47)
MCH RBC QN AUTO: 31.9 PG (ref 27–33)
MCV RBC AUTO: 93.5 FL (ref 81.6–98.3)
MONOCYTES # BLD AUTO: 1.2 10X3/UL (ref 0–1.1)
MONOCYTES NFR BLD AUTO: 8 % (ref 0–10)
NEUTROPHILS # BLD AUTO: 12.9 10X3/UL (ref 1.5–8.4)
NEUTROPHILS NFR BLD AUTO: 85.7 % (ref 40–75)
PLATELET # BLD AUTO: 248 10X3/UL (ref 150–450)
POTASSIUM SERPL-SCNC: 3.8 MMOL/L (ref 3.5–5.1)
PROT UR STRIP.AUTO-MCNC: 15 MG/DL
RBC # BLD AUTO: 4.45 10X6/UL (ref 3.9–5.03)
RBC UR QL AUTO: (no result) HPF (ref 0–3)
SODIUM SERPL-SCNC: 131 MMOL/L (ref 136–145)
SP GR UR STRIP: 1 (ref 1–1.04)
WBC # BLD AUTO: 15.1 10X3/UL (ref 3.5–10.5)
WBC UR QL AUTO: (no result) HPF (ref 0–3)

## 2021-03-30 PROCEDURE — 82962 GLUCOSE BLOOD TEST: CPT

## 2021-03-30 PROCEDURE — U0002 COVID-19 LAB TEST NON-CDC: HCPCS

## 2021-03-30 PROCEDURE — 80053 COMPREHEN METABOLIC PANEL: CPT

## 2021-03-30 PROCEDURE — 74177 CT ABD & PELVIS W/CONTRAST: CPT

## 2021-03-30 PROCEDURE — 87077 CULTURE AEROBIC IDENTIFY: CPT

## 2021-03-30 PROCEDURE — 96375 TX/PRO/DX INJ NEW DRUG ADDON: CPT

## 2021-03-30 PROCEDURE — 36416 COLLJ CAPILLARY BLOOD SPEC: CPT

## 2021-03-30 PROCEDURE — 85025 COMPLETE CBC W/AUTO DIFF WBC: CPT

## 2021-03-30 PROCEDURE — 44970 LAPAROSCOPY APPENDECTOMY: CPT

## 2021-03-30 PROCEDURE — 81015 MICROSCOPIC EXAM OF URINE: CPT

## 2021-03-30 PROCEDURE — 83605 ASSAY OF LACTIC ACID: CPT

## 2021-03-30 PROCEDURE — 99285 EMERGENCY DEPT VISIT HI MDM: CPT

## 2021-03-30 PROCEDURE — 96365 THER/PROPH/DIAG IV INF INIT: CPT

## 2021-03-30 PROCEDURE — 96376 TX/PRO/DX INJ SAME DRUG ADON: CPT

## 2021-03-30 PROCEDURE — 81003 URINALYSIS AUTO W/O SCOPE: CPT

## 2021-03-30 PROCEDURE — 87186 SC STD MICRODIL/AGAR DIL: CPT

## 2021-03-30 PROCEDURE — S0020 INJECTION, BUPIVICAINE HYDRO: HCPCS

## 2021-03-30 PROCEDURE — 88304 TISSUE EXAM BY PATHOLOGIST: CPT

## 2021-03-30 PROCEDURE — 93005 ELECTROCARDIOGRAM TRACING: CPT

## 2021-03-30 PROCEDURE — 83690 ASSAY OF LIPASE: CPT

## 2021-03-30 PROCEDURE — 0DTJ4ZZ RESECTION OF APPENDIX, PERCUTANEOUS ENDOSCOPIC APPROACH: ICD-10-PCS | Performed by: SURGERY

## 2021-03-30 PROCEDURE — 87086 URINE CULTURE/COLONY COUNT: CPT

## 2021-04-16 ENCOUNTER — HOSPITAL ENCOUNTER (OUTPATIENT)
Dept: HOSPITAL 92 - CSHLAB | Age: 76
Discharge: HOME | End: 2021-04-16
Attending: INTERNAL MEDICINE
Payer: MEDICARE

## 2021-04-16 DIAGNOSIS — R06.09: ICD-10-CM

## 2021-04-16 DIAGNOSIS — Z20.822: Primary | ICD-10-CM

## 2021-04-16 PROCEDURE — 87635 SARS-COV-2 COVID-19 AMP PRB: CPT

## 2021-04-16 PROCEDURE — U0003 INFECTIOUS AGENT DETECTION BY NUCLEIC ACID (DNA OR RNA); SEVERE ACUTE RESPIRATORY SYNDROME CORONAVIRUS 2 (SARS-COV-2) (CORONAVIRUS DISEASE [COVID-19]), AMPLIFIED PROBE TECHNIQUE, MAKING USE OF HIGH THROUGHPUT TECHNOLOGIES AS DESCRIBED BY CMS-2020-01-R: HCPCS

## 2021-04-16 PROCEDURE — U0005 INFEC AGEN DETEC AMPLI PROBE: HCPCS

## 2021-04-21 ENCOUNTER — HOSPITAL ENCOUNTER (OUTPATIENT)
Dept: HOSPITAL 92 - CSHCP | Age: 76
Discharge: HOME | End: 2021-04-21
Attending: INTERNAL MEDICINE
Payer: MEDICARE

## 2021-04-21 DIAGNOSIS — R06.09: Primary | ICD-10-CM

## 2021-04-21 DIAGNOSIS — R94.2: ICD-10-CM

## 2021-04-21 PROCEDURE — 94726 PLETHYSMOGRAPHY LUNG VOLUMES: CPT

## 2021-04-21 PROCEDURE — 94060 EVALUATION OF WHEEZING: CPT

## 2021-04-21 PROCEDURE — 94760 N-INVAS EAR/PLS OXIMETRY 1: CPT

## 2021-04-21 PROCEDURE — 94729 DIFFUSING CAPACITY: CPT

## 2021-05-21 ENCOUNTER — HOSPITAL ENCOUNTER (OUTPATIENT)
Dept: HOSPITAL 92 - SLEEPLAB | Age: 76
Discharge: HOME | End: 2021-05-21
Attending: INTERNAL MEDICINE
Payer: MEDICARE

## 2021-05-21 DIAGNOSIS — G47.33: Primary | ICD-10-CM

## 2021-05-21 DIAGNOSIS — R09.02: ICD-10-CM

## 2021-05-21 DIAGNOSIS — E66.9: ICD-10-CM

## 2021-05-21 DIAGNOSIS — G47.10: ICD-10-CM

## 2021-05-21 DIAGNOSIS — R06.83: ICD-10-CM

## 2021-05-21 PROCEDURE — 95811 POLYSOM 6/>YRS CPAP 4/> PARM: CPT

## 2021-12-07 ENCOUNTER — HOSPITAL ENCOUNTER (OUTPATIENT)
Dept: HOSPITAL 92 - LABBT | Age: 76
Discharge: HOME | End: 2021-12-07
Attending: INTERNAL MEDICINE
Payer: MEDICARE

## 2021-12-07 DIAGNOSIS — R07.9: ICD-10-CM

## 2021-12-07 DIAGNOSIS — Z01.812: Primary | ICD-10-CM

## 2021-12-07 DIAGNOSIS — Z20.822: ICD-10-CM

## 2021-12-07 LAB
ALBUMIN SERPL BCG-MCNC: 4.1 G/DL (ref 3.4–4.8)
ALP SERPL-CCNC: 73 U/L (ref 40–110)
ALT SERPL W P-5'-P-CCNC: 20 U/L (ref 8–55)
ANION GAP SERPL CALC-SCNC: 14 MMOL/L (ref 10–20)
AST SERPL-CCNC: 31 U/L (ref 5–34)
BASOPHILS # BLD AUTO: 0 10X3/UL (ref 0–0.2)
BASOPHILS NFR BLD AUTO: 0.5 % (ref 0–2)
BILIRUB SERPL-MCNC: 0.7 MG/DL (ref 0.2–1.2)
BUN SERPL-MCNC: 19 MG/DL (ref 9.8–20.1)
CALCIUM SERPL-MCNC: 8.9 MG/DL (ref 7.8–10.44)
CHLORIDE SERPL-SCNC: 95 MMOL/L (ref 98–107)
CO2 SERPL-SCNC: 29 MMOL/L (ref 23–31)
CREAT CL PREDICTED SERPL C-G-VRATE: 0 ML/MIN (ref 70–130)
EOSINOPHIL # BLD AUTO: 0.1 10X3/UL (ref 0–0.5)
EOSINOPHIL NFR BLD AUTO: 1.4 % (ref 0–6)
GLOBULIN SER CALC-MCNC: 2.9 G/DL (ref 2.4–3.5)
GLUCOSE SERPL-MCNC: 84 MG/DL (ref 83–110)
HGB BLD-MCNC: 12.7 G/DL (ref 12–15.5)
LYMPHOCYTES NFR BLD AUTO: 9.3 % (ref 18–47)
MCH RBC QN AUTO: 33.2 PG (ref 27–33)
MCV RBC AUTO: 97.4 FL (ref 81.6–98.3)
MONOCYTES # BLD AUTO: 0.8 10X3/UL (ref 0–1.1)
MONOCYTES NFR BLD AUTO: 9.3 % (ref 0–10)
NEUTROPHILS # BLD AUTO: 6.5 10X3/UL (ref 1.5–8.4)
NEUTROPHILS NFR BLD AUTO: 78.7 % (ref 40–75)
PLATELET # BLD AUTO: 339 10X3/UL (ref 150–450)
POTASSIUM SERPL-SCNC: 3.3 MMOL/L (ref 3.5–5.1)
RBC # BLD AUTO: 3.82 10X6/UL (ref 3.9–5.03)
SODIUM SERPL-SCNC: 135 MMOL/L (ref 136–145)
WBC # BLD AUTO: 8.3 10X3/UL (ref 3.5–10.5)

## 2021-12-07 PROCEDURE — 80053 COMPREHEN METABOLIC PANEL: CPT

## 2021-12-07 PROCEDURE — U0003 INFECTIOUS AGENT DETECTION BY NUCLEIC ACID (DNA OR RNA); SEVERE ACUTE RESPIRATORY SYNDROME CORONAVIRUS 2 (SARS-COV-2) (CORONAVIRUS DISEASE [COVID-19]), AMPLIFIED PROBE TECHNIQUE, MAKING USE OF HIGH THROUGHPUT TECHNOLOGIES AS DESCRIBED BY CMS-2020-01-R: HCPCS

## 2021-12-07 PROCEDURE — U0005 INFEC AGEN DETEC AMPLI PROBE: HCPCS

## 2021-12-07 PROCEDURE — 85025 COMPLETE CBC W/AUTO DIFF WBC: CPT

## 2021-12-10 ENCOUNTER — HOSPITAL ENCOUNTER (OUTPATIENT)
Dept: HOSPITAL 92 - CCL | Age: 76
Discharge: HOME | End: 2021-12-10
Attending: INTERNAL MEDICINE
Payer: MEDICARE

## 2021-12-10 VITALS — BODY MASS INDEX: 36.3 KG/M2

## 2021-12-10 DIAGNOSIS — K44.9: ICD-10-CM

## 2021-12-10 DIAGNOSIS — Z88.5: ICD-10-CM

## 2021-12-10 DIAGNOSIS — Z79.899: ICD-10-CM

## 2021-12-10 DIAGNOSIS — R07.89: Primary | ICD-10-CM

## 2021-12-10 DIAGNOSIS — Z88.1: ICD-10-CM

## 2021-12-10 DIAGNOSIS — Z88.8: ICD-10-CM

## 2021-12-10 DIAGNOSIS — R06.02: ICD-10-CM

## 2021-12-10 DIAGNOSIS — E66.9: ICD-10-CM

## 2021-12-10 DIAGNOSIS — I10: ICD-10-CM

## 2021-12-10 DIAGNOSIS — Z88.2: ICD-10-CM

## 2021-12-10 PROCEDURE — 99152 MOD SED SAME PHYS/QHP 5/>YRS: CPT

## 2021-12-10 PROCEDURE — 93458 L HRT ARTERY/VENTRICLE ANGIO: CPT

## 2021-12-10 PROCEDURE — B2111ZZ FLUOROSCOPY OF MULTIPLE CORONARY ARTERIES USING LOW OSMOLAR CONTRAST: ICD-10-PCS | Performed by: INTERNAL MEDICINE

## 2021-12-10 PROCEDURE — 4A023N7 MEASUREMENT OF CARDIAC SAMPLING AND PRESSURE, LEFT HEART, PERCUTANEOUS APPROACH: ICD-10-PCS | Performed by: INTERNAL MEDICINE

## 2021-12-30 ENCOUNTER — HOSPITAL ENCOUNTER (INPATIENT)
Dept: HOSPITAL 92 - T4-B | Age: 76
LOS: 3 days | Discharge: HOME | DRG: 177 | End: 2022-01-02
Attending: FAMILY MEDICINE | Admitting: FAMILY MEDICINE
Payer: MEDICARE

## 2021-12-30 VITALS — BODY MASS INDEX: 36.6 KG/M2

## 2021-12-30 DIAGNOSIS — G47.33: ICD-10-CM

## 2021-12-30 DIAGNOSIS — K21.9: ICD-10-CM

## 2021-12-30 DIAGNOSIS — E03.9: ICD-10-CM

## 2021-12-30 DIAGNOSIS — K44.9: ICD-10-CM

## 2021-12-30 DIAGNOSIS — Z79.899: ICD-10-CM

## 2021-12-30 DIAGNOSIS — E87.6: ICD-10-CM

## 2021-12-30 DIAGNOSIS — E87.70: ICD-10-CM

## 2021-12-30 DIAGNOSIS — F41.9: ICD-10-CM

## 2021-12-30 DIAGNOSIS — Z96.652: ICD-10-CM

## 2021-12-30 DIAGNOSIS — Z88.2: ICD-10-CM

## 2021-12-30 DIAGNOSIS — K58.9: ICD-10-CM

## 2021-12-30 DIAGNOSIS — N17.9: ICD-10-CM

## 2021-12-30 DIAGNOSIS — Z82.49: ICD-10-CM

## 2021-12-30 DIAGNOSIS — Z88.6: ICD-10-CM

## 2021-12-30 DIAGNOSIS — R82.71: ICD-10-CM

## 2021-12-30 DIAGNOSIS — Z90.710: ICD-10-CM

## 2021-12-30 DIAGNOSIS — F32.A: ICD-10-CM

## 2021-12-30 DIAGNOSIS — N39.41: ICD-10-CM

## 2021-12-30 DIAGNOSIS — E66.9: ICD-10-CM

## 2021-12-30 DIAGNOSIS — J96.01: ICD-10-CM

## 2021-12-30 DIAGNOSIS — U07.1: Primary | ICD-10-CM

## 2021-12-30 DIAGNOSIS — J84.9: ICD-10-CM

## 2021-12-30 DIAGNOSIS — I10: ICD-10-CM

## 2021-12-30 DIAGNOSIS — Z88.1: ICD-10-CM

## 2021-12-30 DIAGNOSIS — Z90.49: ICD-10-CM

## 2021-12-30 DIAGNOSIS — Z79.890: ICD-10-CM

## 2021-12-30 DIAGNOSIS — Z88.5: ICD-10-CM

## 2021-12-30 PROCEDURE — U0002 COVID-19 LAB TEST NON-CDC: HCPCS

## 2021-12-30 PROCEDURE — 86140 C-REACTIVE PROTEIN: CPT

## 2021-12-30 PROCEDURE — 83735 ASSAY OF MAGNESIUM: CPT

## 2021-12-30 PROCEDURE — 71275 CT ANGIOGRAPHY CHEST: CPT

## 2021-12-30 PROCEDURE — 84484 ASSAY OF TROPONIN QUANT: CPT

## 2021-12-30 PROCEDURE — 93005 ELECTROCARDIOGRAM TRACING: CPT

## 2021-12-30 PROCEDURE — 80048 BASIC METABOLIC PNL TOTAL CA: CPT

## 2021-12-30 PROCEDURE — 80053 COMPREHEN METABOLIC PANEL: CPT

## 2021-12-30 PROCEDURE — 83880 ASSAY OF NATRIURETIC PEPTIDE: CPT

## 2021-12-30 PROCEDURE — 82550 ASSAY OF CK (CPK): CPT

## 2021-12-30 PROCEDURE — 85025 COMPLETE CBC W/AUTO DIFF WBC: CPT

## 2021-12-30 PROCEDURE — 81003 URINALYSIS AUTO W/O SCOPE: CPT

## 2021-12-30 PROCEDURE — 82728 ASSAY OF FERRITIN: CPT

## 2021-12-30 PROCEDURE — 36415 COLL VENOUS BLD VENIPUNCTURE: CPT

## 2021-12-30 PROCEDURE — 81015 MICROSCOPIC EXAM OF URINE: CPT

## 2021-12-30 PROCEDURE — 71045 X-RAY EXAM CHEST 1 VIEW: CPT

## 2021-12-30 PROCEDURE — 83690 ASSAY OF LIPASE: CPT

## 2021-12-30 PROCEDURE — 85379 FIBRIN DEGRADATION QUANT: CPT

## 2021-12-31 LAB
ANION GAP SERPL CALC-SCNC: 16 MMOL/L (ref 10–20)
BUN SERPL-MCNC: 11 MG/DL (ref 9.8–20.1)
CALCIUM SERPL-MCNC: 9.1 MG/DL (ref 7.8–10.44)
CHLORIDE SERPL-SCNC: 99 MMOL/L (ref 98–107)
CK SERPL-CCNC: 54 U/L (ref 29–168)
CO2 SERPL-SCNC: 26 MMOL/L (ref 23–31)
CREAT CL PREDICTED SERPL C-G-VRATE: 75 ML/MIN (ref 70–130)
CRP SERPL-MCNC: 3.02 MG/DL
GLUCOSE SERPL-MCNC: 114 MG/DL (ref 83–110)
POTASSIUM SERPL-SCNC: 3.5 MMOL/L (ref 3.5–5.1)
SODIUM SERPL-SCNC: 137 MMOL/L (ref 136–145)

## 2022-01-01 LAB
ANION GAP SERPL CALC-SCNC: 16 MMOL/L (ref 10–20)
BUN SERPL-MCNC: 17 MG/DL (ref 9.8–20.1)
CALCIUM SERPL-MCNC: 8.9 MG/DL (ref 7.8–10.44)
CHLORIDE SERPL-SCNC: 99 MMOL/L (ref 98–107)
CO2 SERPL-SCNC: 26 MMOL/L (ref 23–31)
CREAT CL PREDICTED SERPL C-G-VRATE: 58 ML/MIN (ref 70–130)
GLUCOSE SERPL-MCNC: 106 MG/DL (ref 83–110)
MAGNESIUM SERPL-MCNC: 1.8 MG/DL (ref 1.6–2.6)
POTASSIUM SERPL-SCNC: 2.9 MMOL/L (ref 3.5–5.1)
SODIUM SERPL-SCNC: 138 MMOL/L (ref 136–145)

## 2022-01-02 VITALS — DIASTOLIC BLOOD PRESSURE: 77 MMHG | SYSTOLIC BLOOD PRESSURE: 171 MMHG

## 2022-01-02 VITALS — TEMPERATURE: 97.6 F

## 2022-01-02 LAB
ANION GAP SERPL CALC-SCNC: 11 MMOL/L (ref 10–20)
BUN SERPL-MCNC: 16 MG/DL (ref 9.8–20.1)
CALCIUM SERPL-MCNC: 8.9 MG/DL (ref 7.8–10.44)
CHLORIDE SERPL-SCNC: 101 MMOL/L (ref 98–107)
CO2 SERPL-SCNC: 30 MMOL/L (ref 23–31)
CREAT CL PREDICTED SERPL C-G-VRATE: 70 ML/MIN (ref 70–130)
GLUCOSE SERPL-MCNC: 105 MG/DL (ref 83–110)
POTASSIUM SERPL-SCNC: 3.2 MMOL/L (ref 3.5–5.1)
SODIUM SERPL-SCNC: 139 MMOL/L (ref 136–145)

## 2022-01-21 ENCOUNTER — HOSPITAL ENCOUNTER (OUTPATIENT)
Dept: HOSPITAL 92 - LABBT | Age: 77
Discharge: HOME | End: 2022-01-21
Attending: INTERNAL MEDICINE
Payer: MEDICARE

## 2022-01-21 DIAGNOSIS — K21.9: ICD-10-CM

## 2022-01-21 DIAGNOSIS — K44.9: ICD-10-CM

## 2022-01-21 DIAGNOSIS — R13.19: ICD-10-CM

## 2022-01-21 DIAGNOSIS — Z20.822: ICD-10-CM

## 2022-01-21 DIAGNOSIS — Z01.812: Primary | ICD-10-CM

## 2022-01-21 PROCEDURE — U0003 INFECTIOUS AGENT DETECTION BY NUCLEIC ACID (DNA OR RNA); SEVERE ACUTE RESPIRATORY SYNDROME CORONAVIRUS 2 (SARS-COV-2) (CORONAVIRUS DISEASE [COVID-19]), AMPLIFIED PROBE TECHNIQUE, MAKING USE OF HIGH THROUGHPUT TECHNOLOGIES AS DESCRIBED BY CMS-2020-01-R: HCPCS

## 2022-01-21 PROCEDURE — U0005 INFEC AGEN DETEC AMPLI PROBE: HCPCS

## 2022-01-26 ENCOUNTER — HOSPITAL ENCOUNTER (OUTPATIENT)
Dept: HOSPITAL 92 - SDC | Age: 77
Discharge: HOME | End: 2022-01-26
Attending: INTERNAL MEDICINE
Payer: MEDICARE

## 2022-01-26 VITALS — BODY MASS INDEX: 36.3 KG/M2

## 2022-01-26 DIAGNOSIS — Z79.899: ICD-10-CM

## 2022-01-26 DIAGNOSIS — Z88.2: ICD-10-CM

## 2022-01-26 DIAGNOSIS — K44.9: ICD-10-CM

## 2022-01-26 DIAGNOSIS — K22.2: Primary | ICD-10-CM

## 2022-01-26 DIAGNOSIS — Z88.1: ICD-10-CM

## 2022-01-26 DIAGNOSIS — K21.9: ICD-10-CM

## 2022-01-26 DIAGNOSIS — Z88.5: ICD-10-CM

## 2022-01-26 PROCEDURE — 0D748ZZ DILATION OF ESOPHAGOGASTRIC JUNCTION, VIA NATURAL OR ARTIFICIAL OPENING ENDOSCOPIC: ICD-10-PCS | Performed by: INTERNAL MEDICINE

## 2022-02-24 ENCOUNTER — HOSPITAL ENCOUNTER (OUTPATIENT)
Dept: HOSPITAL 92 - CSHCT | Age: 77
Discharge: HOME | End: 2022-02-24
Attending: INTERNAL MEDICINE
Payer: MEDICARE

## 2022-02-24 DIAGNOSIS — R91.1: ICD-10-CM

## 2022-02-24 DIAGNOSIS — J98.11: ICD-10-CM

## 2022-02-24 DIAGNOSIS — R91.8: ICD-10-CM

## 2022-02-24 DIAGNOSIS — J90: Primary | ICD-10-CM

## 2022-02-24 PROCEDURE — 71250 CT THORAX DX C-: CPT

## 2022-05-10 ENCOUNTER — HOSPITAL ENCOUNTER (OUTPATIENT)
Dept: HOSPITAL 92 - LABBT | Age: 77
Discharge: HOME | End: 2022-05-10
Attending: INTERNAL MEDICINE
Payer: MEDICARE

## 2022-05-10 DIAGNOSIS — Z01.818: Primary | ICD-10-CM

## 2022-05-10 DIAGNOSIS — Z20.822: ICD-10-CM

## 2022-05-10 LAB
ALBUMIN SERPL BCG-MCNC: 4.1 G/DL (ref 3.4–4.8)
ALP SERPL-CCNC: 87 U/L (ref 40–110)
ALT SERPL W P-5'-P-CCNC: 19 U/L (ref 8–55)
ANION GAP SERPL CALC-SCNC: 16 MMOL/L (ref 10–20)
AST SERPL-CCNC: 29 U/L (ref 5–34)
BASOPHILS # BLD AUTO: 0.1 10X3/UL (ref 0–0.2)
BASOPHILS NFR BLD AUTO: 0.8 % (ref 0–2)
BILIRUB SERPL-MCNC: 1.1 MG/DL (ref 0.2–1.2)
BUN SERPL-MCNC: 9 MG/DL (ref 9.8–20.1)
CALCIUM SERPL-MCNC: 9 MG/DL (ref 7.8–10.44)
CHLORIDE SERPL-SCNC: 99 MMOL/L (ref 98–107)
CO2 SERPL-SCNC: 28 MMOL/L (ref 23–31)
CREAT CL PREDICTED SERPL C-G-VRATE: 0 ML/MIN (ref 70–130)
EOSINOPHIL # BLD AUTO: 0.1 10X3/UL (ref 0–0.5)
EOSINOPHIL NFR BLD AUTO: 1.7 % (ref 0–6)
GLOBULIN SER CALC-MCNC: 2.4 G/DL (ref 2.4–3.5)
GLUCOSE SERPL-MCNC: 99 MG/DL (ref 83–110)
HGB BLD-MCNC: 12.4 G/DL (ref 12–15.5)
LYMPHOCYTES NFR BLD AUTO: 13.9 % (ref 18–47)
MCH RBC QN AUTO: 32.3 PG (ref 27–33)
MCV RBC AUTO: 97.7 FL (ref 81.6–98.3)
MONOCYTES # BLD AUTO: 0.5 10X3/UL (ref 0–1.1)
MONOCYTES NFR BLD AUTO: 8.4 % (ref 0–10)
NEUTROPHILS # BLD AUTO: 4.8 10X3/UL (ref 1.5–8.4)
NEUTROPHILS NFR BLD AUTO: 74.6 % (ref 40–75)
PLATELET # BLD AUTO: 294 10X3/UL (ref 150–450)
POTASSIUM SERPL-SCNC: 3.1 MMOL/L (ref 3.5–5.1)
RBC # BLD AUTO: 3.84 10X6/UL (ref 3.9–5.03)
SODIUM SERPL-SCNC: 140 MMOL/L (ref 136–145)
WBC # BLD AUTO: 6.4 10X3/UL (ref 3.5–10.5)

## 2022-05-10 PROCEDURE — 93010 ELECTROCARDIOGRAM REPORT: CPT

## 2022-05-10 PROCEDURE — U0005 INFEC AGEN DETEC AMPLI PROBE: HCPCS

## 2022-05-10 PROCEDURE — 93005 ELECTROCARDIOGRAM TRACING: CPT

## 2022-05-10 PROCEDURE — 80053 COMPREHEN METABOLIC PANEL: CPT

## 2022-05-10 PROCEDURE — U0003 INFECTIOUS AGENT DETECTION BY NUCLEIC ACID (DNA OR RNA); SEVERE ACUTE RESPIRATORY SYNDROME CORONAVIRUS 2 (SARS-COV-2) (CORONAVIRUS DISEASE [COVID-19]), AMPLIFIED PROBE TECHNIQUE, MAKING USE OF HIGH THROUGHPUT TECHNOLOGIES AS DESCRIBED BY CMS-2020-01-R: HCPCS

## 2022-05-10 PROCEDURE — 85025 COMPLETE CBC W/AUTO DIFF WBC: CPT

## 2022-05-13 ENCOUNTER — HOSPITAL ENCOUNTER (OUTPATIENT)
Dept: HOSPITAL 92 - SDC | Age: 77
Discharge: HOME | End: 2022-05-13
Attending: INTERNAL MEDICINE
Payer: MEDICARE

## 2022-05-13 VITALS — BODY MASS INDEX: 35.9 KG/M2

## 2022-05-13 DIAGNOSIS — Z88.2: ICD-10-CM

## 2022-05-13 DIAGNOSIS — I27.20: Primary | ICD-10-CM

## 2022-05-13 DIAGNOSIS — Z79.899: ICD-10-CM

## 2022-05-13 DIAGNOSIS — Z79.890: ICD-10-CM

## 2022-05-13 DIAGNOSIS — Z88.1: ICD-10-CM

## 2022-05-13 DIAGNOSIS — Z88.5: ICD-10-CM

## 2022-05-13 DIAGNOSIS — Q21.1: ICD-10-CM

## 2022-05-13 LAB
ANALYZER IN CARDIO: (no result)
BASE EXCESS STD BLDA CALC-SCNC: 4.1 MEQ/L
CA-I BLDA-SCNC: 1.03 MMOL/L (ref 1.12–1.3)
HCO3 BLDA-SCNC: 28.8 MEQ/L (ref 22–28)
HCT VFR BLDA CALC: 38 % (ref 36–47)
HGB BLDA-MCNC: 12.8 G/DL (ref 12–16)
PCO2 BLDA: 43.5 MMHG (ref 35–45)
PH BLDA: 7.43 [PH] (ref 7.35–7.45)
PH BLDA: 7.44 [PH] (ref 7.35–7.45)
PO2 BLDA: 42.2 MMHG (ref 70–?)
PO2 BLDA: 43.6 MMHG (ref 70–?)
POTASSIUM BLD-SCNC: 2.64 MMOL/L (ref 3.7–5.3)

## 2022-05-13 PROCEDURE — 93451 RIGHT HEART CATH: CPT

## 2022-05-13 PROCEDURE — 99153 MOD SED SAME PHYS/QHP EA: CPT

## 2022-05-13 PROCEDURE — 4A023N6 MEASUREMENT OF CARDIAC SAMPLING AND PRESSURE, RIGHT HEART, PERCUTANEOUS APPROACH: ICD-10-PCS | Performed by: INTERNAL MEDICINE

## 2022-05-13 PROCEDURE — 99152 MOD SED SAME PHYS/QHP 5/>YRS: CPT

## 2022-05-13 PROCEDURE — 82805 BLOOD GASES W/O2 SATURATION: CPT

## 2022-05-26 ENCOUNTER — HOSPITAL ENCOUNTER (OUTPATIENT)
Dept: HOSPITAL 92 - RAD | Age: 77
Discharge: HOME | End: 2022-05-26
Attending: INTERNAL MEDICINE
Payer: MEDICARE

## 2022-05-26 DIAGNOSIS — R06.00: Primary | ICD-10-CM

## 2022-05-26 PROCEDURE — 71046 X-RAY EXAM CHEST 2 VIEWS: CPT

## 2022-12-08 ENCOUNTER — HOSPITAL ENCOUNTER (OUTPATIENT)
Dept: HOSPITAL 92 - RAD | Age: 77
Discharge: HOME | End: 2022-12-08
Attending: INTERNAL MEDICINE
Payer: MEDICARE

## 2022-12-08 DIAGNOSIS — I27.20: Primary | ICD-10-CM

## 2022-12-08 DIAGNOSIS — J90: ICD-10-CM

## 2022-12-08 PROCEDURE — 71046 X-RAY EXAM CHEST 2 VIEWS: CPT

## 2022-12-09 ENCOUNTER — HOSPITAL ENCOUNTER (OUTPATIENT)
Dept: HOSPITAL 92 - SDC | Age: 77
Discharge: HOME | End: 2022-12-09
Attending: INTERNAL MEDICINE
Payer: MEDICARE

## 2022-12-09 VITALS — BODY MASS INDEX: 36.3 KG/M2

## 2022-12-09 DIAGNOSIS — Z79.890: ICD-10-CM

## 2022-12-09 DIAGNOSIS — K21.9: ICD-10-CM

## 2022-12-09 DIAGNOSIS — Z88.5: ICD-10-CM

## 2022-12-09 DIAGNOSIS — Z99.81: ICD-10-CM

## 2022-12-09 DIAGNOSIS — Z79.899: ICD-10-CM

## 2022-12-09 DIAGNOSIS — Z88.1: ICD-10-CM

## 2022-12-09 DIAGNOSIS — G47.33: ICD-10-CM

## 2022-12-09 DIAGNOSIS — J90: Primary | ICD-10-CM

## 2022-12-09 DIAGNOSIS — I10: ICD-10-CM

## 2022-12-09 DIAGNOSIS — Z79.82: ICD-10-CM

## 2022-12-09 DIAGNOSIS — Z88.2: ICD-10-CM

## 2022-12-09 DIAGNOSIS — I27.20: ICD-10-CM

## 2022-12-09 DIAGNOSIS — E66.9: ICD-10-CM

## 2022-12-09 LAB
AMYLASE PLR-CCNC: (no result) U/L
GLUCOSE PLR-MCNC: 80 MG/DL
LDH PLR L TO P-CCNC: 258 U/L
NEUTROPHILS NFR FLD: 26 %
NONHEMATIC CELLS NFR FLD MANUAL: 35 %
PROT PLR-MCNC: 3.7 G/DL
RBC # FLD AUTO: 561 /CU.MM
TRIGL FLD-MCNC: 20 MG/DL
WBC # FLD AUTO: 1770 /CU.MM

## 2022-12-09 PROCEDURE — 87116 MYCOBACTERIA CULTURE: CPT

## 2022-12-09 PROCEDURE — 83986 ASSAY PH BODY FLUID NOS: CPT

## 2022-12-09 PROCEDURE — 87205 SMEAR GRAM STAIN: CPT

## 2022-12-09 PROCEDURE — 85060 BLOOD SMEAR INTERPRETATION: CPT

## 2022-12-09 PROCEDURE — 32555 ASPIRATE PLEURA W/ IMAGING: CPT

## 2022-12-09 PROCEDURE — 88305 TISSUE EXAM BY PATHOLOGIST: CPT

## 2022-12-09 PROCEDURE — 0W9B3ZZ DRAINAGE OF LEFT PLEURAL CAVITY, PERCUTANEOUS APPROACH: ICD-10-PCS | Performed by: INTERNAL MEDICINE

## 2022-12-09 PROCEDURE — 88342 IMHCHEM/IMCYTCHM 1ST ANTB: CPT

## 2022-12-09 PROCEDURE — 89051 BODY FLUID CELL COUNT: CPT

## 2022-12-09 PROCEDURE — 87070 CULTURE OTHR SPECIMN AEROBIC: CPT

## 2022-12-09 PROCEDURE — 83615 LACTATE (LD) (LDH) ENZYME: CPT

## 2022-12-09 PROCEDURE — 88341 IMHCHEM/IMCYTCHM EA ADD ANTB: CPT

## 2022-12-09 PROCEDURE — 88112 CYTOPATH CELL ENHANCE TECH: CPT

## 2022-12-09 PROCEDURE — 84478 ASSAY OF TRIGLYCERIDES: CPT

## 2022-12-09 PROCEDURE — 82945 GLUCOSE OTHER FLUID: CPT

## 2022-12-09 PROCEDURE — 87206 SMEAR FLUORESCENT/ACID STAI: CPT

## 2022-12-09 PROCEDURE — 84157 ASSAY OF PROTEIN OTHER: CPT

## 2022-12-09 PROCEDURE — 82150 ASSAY OF AMYLASE: CPT

## 2022-12-29 ENCOUNTER — HOSPITAL ENCOUNTER (OUTPATIENT)
Dept: HOSPITAL 92 - RAD | Age: 77
Discharge: HOME | End: 2022-12-29
Attending: INTERNAL MEDICINE
Payer: MEDICARE

## 2022-12-29 DIAGNOSIS — J98.11: ICD-10-CM

## 2022-12-29 DIAGNOSIS — R91.8: ICD-10-CM

## 2022-12-29 DIAGNOSIS — R06.00: Primary | ICD-10-CM

## 2022-12-29 DIAGNOSIS — J90: ICD-10-CM

## 2022-12-29 PROCEDURE — 71046 X-RAY EXAM CHEST 2 VIEWS: CPT

## 2023-02-10 ENCOUNTER — HOSPITAL ENCOUNTER (OUTPATIENT)
Dept: HOSPITAL 92 - RAD | Age: 78
Discharge: HOME | End: 2023-02-10
Attending: INTERNAL MEDICINE
Payer: MEDICARE

## 2023-02-10 DIAGNOSIS — R06.00: Primary | ICD-10-CM

## 2023-02-10 DIAGNOSIS — R91.8: ICD-10-CM

## 2023-02-10 PROCEDURE — 71046 X-RAY EXAM CHEST 2 VIEWS: CPT

## 2023-02-25 ENCOUNTER — HOSPITAL ENCOUNTER (EMERGENCY)
Dept: HOSPITAL 92 - ERS | Age: 78
Discharge: HOME | End: 2023-02-25
Payer: MEDICARE

## 2023-02-25 DIAGNOSIS — I10: ICD-10-CM

## 2023-02-25 DIAGNOSIS — E66.9: ICD-10-CM

## 2023-02-25 DIAGNOSIS — R60.9: Primary | ICD-10-CM

## 2023-02-25 DIAGNOSIS — R06.00: ICD-10-CM

## 2023-02-25 DIAGNOSIS — K21.9: ICD-10-CM

## 2023-02-25 LAB
ALBUMIN SERPL BCG-MCNC: 4.2 G/DL (ref 3.4–4.8)
ALP SERPL-CCNC: 57 U/L (ref 40–110)
ALT SERPL W P-5'-P-CCNC: 8 U/L (ref 8–55)
ANION GAP SERPL CALC-SCNC: 19 MMOL/L (ref 10–20)
AST SERPL-CCNC: 22 U/L (ref 5–34)
BASOPHILS # BLD AUTO: 0 THOU/UL (ref 0–0.2)
BASOPHILS NFR BLD AUTO: 0.5 % (ref 0–1)
BILIRUB SERPL-MCNC: 0.5 MG/DL (ref 0.2–1.2)
BUN SERPL-MCNC: 25 MG/DL (ref 9.8–20.1)
CALCIUM SERPL-MCNC: 9.2 MG/DL (ref 7.8–10.44)
CHLORIDE SERPL-SCNC: 102 MMOL/L (ref 98–107)
CO2 SERPL-SCNC: 24 MMOL/L (ref 23–31)
CREAT CL PREDICTED SERPL C-G-VRATE: 0 ML/MIN (ref 70–130)
EOSINOPHIL # BLD AUTO: 0.1 THOU/UL (ref 0–0.7)
EOSINOPHIL NFR BLD AUTO: 1.2 % (ref 0–10)
GLOBULIN SER CALC-MCNC: 3.2 G/DL (ref 2.4–3.5)
GLUCOSE SERPL-MCNC: 95 MG/DL (ref 83–110)
HGB BLD-MCNC: 12.1 G/DL (ref 12–16)
LYMPHOCYTES # BLD: 0.7 THOU/UL (ref 1.2–3.4)
LYMPHOCYTES NFR BLD AUTO: 10.7 % (ref 21–51)
MCH RBC QN AUTO: 37.1 PG (ref 27–31)
MCV RBC AUTO: 105 FL (ref 78–98)
MONOCYTES # BLD AUTO: 0.4 THOU/UL (ref 0.11–0.59)
MONOCYTES NFR BLD AUTO: 6.3 % (ref 0–10)
NEUTROPHILS # BLD AUTO: 5.3 THOU/UL (ref 1.4–6.5)
NEUTROPHILS NFR BLD AUTO: 81.2 % (ref 42–75)
PLATELET # BLD AUTO: 214 10X3/UL (ref 130–400)
POTASSIUM SERPL-SCNC: 4.1 MMOL/L (ref 3.5–5.1)
RBC # BLD AUTO: 3.27 MILL/UL (ref 4.2–5.4)
SODIUM SERPL-SCNC: 141 MMOL/L (ref 136–145)
WBC # BLD AUTO: 6.5 10X3/UL (ref 4.8–10.8)

## 2023-02-25 PROCEDURE — 93005 ELECTROCARDIOGRAM TRACING: CPT

## 2023-02-25 PROCEDURE — 83880 ASSAY OF NATRIURETIC PEPTIDE: CPT

## 2023-02-25 PROCEDURE — 80053 COMPREHEN METABOLIC PANEL: CPT

## 2023-02-25 PROCEDURE — 74177 CT ABD & PELVIS W/CONTRAST: CPT

## 2023-02-25 PROCEDURE — 71045 X-RAY EXAM CHEST 1 VIEW: CPT

## 2023-02-25 PROCEDURE — 84484 ASSAY OF TROPONIN QUANT: CPT

## 2023-02-25 PROCEDURE — 71275 CT ANGIOGRAPHY CHEST: CPT

## 2023-02-25 PROCEDURE — 36415 COLL VENOUS BLD VENIPUNCTURE: CPT

## 2023-02-25 PROCEDURE — 85025 COMPLETE CBC W/AUTO DIFF WBC: CPT

## 2023-03-13 ENCOUNTER — HOSPITAL ENCOUNTER (INPATIENT)
Dept: HOSPITAL 92 - ERS | Age: 78
LOS: 5 days | Discharge: HOME | DRG: 682 | End: 2023-03-18
Attending: INTERNAL MEDICINE | Admitting: FAMILY MEDICINE
Payer: MEDICARE

## 2023-03-13 VITALS — BODY MASS INDEX: 36.1 KG/M2

## 2023-03-13 DIAGNOSIS — I50.33: ICD-10-CM

## 2023-03-13 DIAGNOSIS — N39.0: ICD-10-CM

## 2023-03-13 DIAGNOSIS — E87.1: ICD-10-CM

## 2023-03-13 DIAGNOSIS — N17.9: Primary | ICD-10-CM

## 2023-03-13 DIAGNOSIS — Z88.1: ICD-10-CM

## 2023-03-13 DIAGNOSIS — Z88.2: ICD-10-CM

## 2023-03-13 DIAGNOSIS — D63.1: ICD-10-CM

## 2023-03-13 DIAGNOSIS — E66.9: ICD-10-CM

## 2023-03-13 DIAGNOSIS — E03.9: ICD-10-CM

## 2023-03-13 DIAGNOSIS — E87.6: ICD-10-CM

## 2023-03-13 DIAGNOSIS — K58.9: ICD-10-CM

## 2023-03-13 DIAGNOSIS — I27.20: ICD-10-CM

## 2023-03-13 DIAGNOSIS — R35.0: ICD-10-CM

## 2023-03-13 DIAGNOSIS — Z88.5: ICD-10-CM

## 2023-03-13 DIAGNOSIS — Z79.890: ICD-10-CM

## 2023-03-13 DIAGNOSIS — Z79.899: ICD-10-CM

## 2023-03-13 DIAGNOSIS — N18.30: ICD-10-CM

## 2023-03-13 DIAGNOSIS — I13.0: ICD-10-CM

## 2023-03-13 DIAGNOSIS — G47.33: ICD-10-CM

## 2023-03-13 LAB
ALBUMIN SERPL BCG-MCNC: 4.3 G/DL (ref 3.4–4.8)
ALP SERPL-CCNC: 44 U/L (ref 40–110)
ALT SERPL W P-5'-P-CCNC: (no result) U/L (ref 8–55)
ANION GAP SERPL CALC-SCNC: 19 MMOL/L (ref 10–20)
AST SERPL-CCNC: 17 U/L (ref 5–34)
BACTERIA UR QL AUTO: (no result) HPF
BASOPHILS # BLD AUTO: 0 THOU/UL (ref 0–0.2)
BASOPHILS NFR BLD AUTO: 0.3 % (ref 0–1)
BILIRUB SERPL-MCNC: 0.3 MG/DL (ref 0.2–1.2)
BUN SERPL-MCNC: 44 MG/DL (ref 9.8–20.1)
CALCIUM SERPL-MCNC: 8.7 MG/DL (ref 7.8–10.44)
CHLORIDE SERPL-SCNC: 90 MMOL/L (ref 98–107)
CK SERPL-CCNC: 77 U/L (ref 29–168)
CO2 SERPL-SCNC: 21 MMOL/L (ref 23–31)
CREAT CL PREDICTED SERPL C-G-VRATE: 0 ML/MIN (ref 70–130)
EOSINOPHIL # BLD AUTO: 0 THOU/UL (ref 0–0.7)
EOSINOPHIL NFR BLD AUTO: 0.6 % (ref 0–10)
GLOBULIN SER CALC-MCNC: 2.6 G/DL (ref 2.4–3.5)
GLUCOSE SERPL-MCNC: 100 MG/DL (ref 83–110)
HGB BLD-MCNC: 11.3 G/DL (ref 12–16)
LIPASE SERPL-CCNC: 69 U/L (ref 8–78)
LYMPHOCYTES # BLD: 0.6 THOU/UL (ref 1.2–3.4)
LYMPHOCYTES NFR BLD AUTO: 8 % (ref 21–51)
MCH RBC QN AUTO: 34.3 PG (ref 27–31)
MCV RBC AUTO: 104 FL (ref 78–98)
MONOCYTES # BLD AUTO: 0.6 THOU/UL (ref 0.11–0.59)
MONOCYTES NFR BLD AUTO: 8.6 % (ref 0–10)
NEUTROPHILS # BLD AUTO: 5.8 THOU/UL (ref 1.4–6.5)
NEUTROPHILS NFR BLD AUTO: 82.5 % (ref 42–75)
PLATELET # BLD AUTO: 250 10X3/UL (ref 130–400)
POTASSIUM SERPL-SCNC: 3.8 MMOL/L (ref 3.5–5.1)
RBC # BLD AUTO: 3.31 MILL/UL (ref 4.2–5.4)
RBC UR QL AUTO: (no result) HPF (ref 0–3)
SODIUM SERPL-SCNC: 126 MMOL/L (ref 136–145)
SP GR UR STRIP: 1.01 (ref 1–1.04)
WBC # BLD AUTO: 7 10X3/UL (ref 4.8–10.8)
WBC UR QL AUTO: (no result) HPF (ref 0–3)

## 2023-03-13 PROCEDURE — 82550 ASSAY OF CK (CPK): CPT

## 2023-03-13 PROCEDURE — 85025 COMPLETE CBC W/AUTO DIFF WBC: CPT

## 2023-03-13 PROCEDURE — 81003 URINALYSIS AUTO W/O SCOPE: CPT

## 2023-03-13 PROCEDURE — 36415 COLL VENOUS BLD VENIPUNCTURE: CPT

## 2023-03-13 PROCEDURE — 80053 COMPREHEN METABOLIC PANEL: CPT

## 2023-03-13 PROCEDURE — 87086 URINE CULTURE/COLONY COUNT: CPT

## 2023-03-13 PROCEDURE — 84484 ASSAY OF TROPONIN QUANT: CPT

## 2023-03-13 PROCEDURE — P9047 ALBUMIN (HUMAN), 25%, 50ML: HCPCS

## 2023-03-13 PROCEDURE — 83605 ASSAY OF LACTIC ACID: CPT

## 2023-03-13 PROCEDURE — 71045 X-RAY EXAM CHEST 1 VIEW: CPT

## 2023-03-13 PROCEDURE — 74176 CT ABD & PELVIS W/O CONTRAST: CPT

## 2023-03-13 PROCEDURE — 93005 ELECTROCARDIOGRAM TRACING: CPT

## 2023-03-13 PROCEDURE — U0002 COVID-19 LAB TEST NON-CDC: HCPCS

## 2023-03-13 PROCEDURE — 82728 ASSAY OF FERRITIN: CPT

## 2023-03-13 PROCEDURE — 87040 BLOOD CULTURE FOR BACTERIA: CPT

## 2023-03-13 PROCEDURE — 80048 BASIC METABOLIC PNL TOTAL CA: CPT

## 2023-03-13 PROCEDURE — 81015 MICROSCOPIC EXAM OF URINE: CPT

## 2023-03-13 PROCEDURE — 82274 ASSAY TEST FOR BLOOD FECAL: CPT

## 2023-03-13 PROCEDURE — 83690 ASSAY OF LIPASE: CPT

## 2023-03-13 PROCEDURE — 76770 US EXAM ABDO BACK WALL COMP: CPT

## 2023-03-13 RX ADMIN — CEFTRIAXONE SCH MLS: 1 INJECTION, POWDER, FOR SOLUTION INTRAMUSCULAR; INTRAVENOUS at 21:15

## 2023-03-13 RX ADMIN — FENTANYL CITRATE PRN MCG: 50 INJECTION INTRAMUSCULAR; INTRAVENOUS at 21:27

## 2023-03-14 LAB
ANION GAP SERPL CALC-SCNC: 16 MMOL/L (ref 10–20)
BASOPHILS # BLD AUTO: 0 THOU/UL (ref 0–0.2)
BASOPHILS NFR BLD AUTO: 0 % (ref 0–1)
BUN SERPL-MCNC: 43 MG/DL (ref 9.8–20.1)
CALCIUM SERPL-MCNC: 8.4 MG/DL (ref 7.8–10.44)
CHLORIDE SERPL-SCNC: 99 MMOL/L (ref 98–107)
CO2 SERPL-SCNC: 19 MMOL/L (ref 23–31)
CREAT CL PREDICTED SERPL C-G-VRATE: 23 ML/MIN (ref 70–130)
EOSINOPHIL # BLD AUTO: 0.1 THOU/UL (ref 0–0.7)
EOSINOPHIL NFR BLD AUTO: 1.6 % (ref 0–10)
GLUCOSE SERPL-MCNC: 93 MG/DL (ref 83–110)
HGB BLD-MCNC: 10.5 G/DL (ref 12–16)
LYMPHOCYTES # BLD: 0.6 THOU/UL (ref 1.2–3.4)
LYMPHOCYTES NFR BLD AUTO: 6.7 % (ref 21–51)
MCH RBC QN AUTO: 34.8 PG (ref 27–31)
MCV RBC AUTO: 104 FL (ref 78–98)
MONOCYTES # BLD AUTO: 0.8 THOU/UL (ref 0.11–0.59)
MONOCYTES NFR BLD AUTO: 9.3 % (ref 0–10)
NEUTROPHILS # BLD AUTO: 7.2 THOU/UL (ref 1.4–6.5)
NEUTROPHILS NFR BLD AUTO: 82.4 % (ref 42–75)
PLATELET # BLD AUTO: 222 10X3/UL (ref 130–400)
POTASSIUM SERPL-SCNC: 3.4 MMOL/L (ref 3.5–5.1)
RBC # BLD AUTO: 3.02 MILL/UL (ref 4.2–5.4)
SODIUM SERPL-SCNC: 131 MMOL/L (ref 136–145)
WBC # BLD AUTO: 8.8 10X3/UL (ref 4.8–10.8)

## 2023-03-14 RX ADMIN — CEFTRIAXONE SCH MLS: 1 INJECTION, POWDER, FOR SOLUTION INTRAMUSCULAR; INTRAVENOUS at 18:08

## 2023-03-14 RX ADMIN — ALBUMIN HUMAN SCH GM: 250 SOLUTION INTRAVENOUS at 23:52

## 2023-03-14 RX ADMIN — ALUMINUM ZIRCONIUM TRICHLOROHYDREX GLY SCH EACH: 0.2 STICK TOPICAL at 11:55

## 2023-03-14 RX ADMIN — ALBUMIN HUMAN SCH GM: 250 SOLUTION INTRAVENOUS at 18:48

## 2023-03-14 RX ADMIN — FENTANYL CITRATE PRN MCG: 50 INJECTION INTRAMUSCULAR; INTRAVENOUS at 01:30

## 2023-03-14 RX ADMIN — FENTANYL CITRATE PRN MCG: 50 INJECTION INTRAMUSCULAR; INTRAVENOUS at 05:29

## 2023-03-14 RX ADMIN — ASPIRIN 81 MG CHEWABLE TABLET SCH MG: 81 TABLET CHEWABLE at 09:41

## 2023-03-15 LAB
ANION GAP SERPL CALC-SCNC: 13 MMOL/L (ref 10–20)
BUN SERPL-MCNC: 43 MG/DL (ref 9.8–20.1)
CALCIUM SERPL-MCNC: 8.6 MG/DL (ref 7.8–10.44)
CHLORIDE SERPL-SCNC: 103 MMOL/L (ref 98–107)
CO2 SERPL-SCNC: 22 MMOL/L (ref 23–31)
CREAT CL PREDICTED SERPL C-G-VRATE: 25 ML/MIN (ref 70–130)
GLUCOSE SERPL-MCNC: 96 MG/DL (ref 83–110)
HGB BLD-MCNC: 9.1 G/DL (ref 12–16)
MCH RBC QN AUTO: 34.4 PG (ref 27–31)
MCV RBC AUTO: 104 FL (ref 78–98)
MDIFF COMPLETE?: YES
PLATELET # BLD AUTO: 181 10X3/UL (ref 130–400)
POTASSIUM SERPL-SCNC: 3.4 MMOL/L (ref 3.5–5.1)
RBC # BLD AUTO: 2.65 MILL/UL (ref 4.2–5.4)
SODIUM SERPL-SCNC: 135 MMOL/L (ref 136–145)
WBC # BLD AUTO: 5.8 10X3/UL (ref 4.8–10.8)

## 2023-03-15 RX ADMIN — ALBUMIN HUMAN SCH GM: 250 SOLUTION INTRAVENOUS at 13:48

## 2023-03-15 RX ADMIN — ALUMINUM ZIRCONIUM TRICHLOROHYDREX GLY SCH EACH: 0.2 STICK TOPICAL at 09:06

## 2023-03-15 RX ADMIN — ALBUMIN HUMAN SCH GM: 250 SOLUTION INTRAVENOUS at 06:23

## 2023-03-15 RX ADMIN — ALBUMIN HUMAN SCH GM: 250 SOLUTION INTRAVENOUS at 18:27

## 2023-03-15 RX ADMIN — ASPIRIN 81 MG CHEWABLE TABLET SCH MG: 81 TABLET CHEWABLE at 09:05

## 2023-03-16 LAB
ANION GAP SERPL CALC-SCNC: 14 MMOL/L (ref 10–20)
BASOPHILS # BLD AUTO: 0 THOU/UL (ref 0–0.2)
BASOPHILS NFR BLD AUTO: 0.5 % (ref 0–1)
BUN SERPL-MCNC: 36 MG/DL (ref 9.8–20.1)
CALCIUM SERPL-MCNC: 9.3 MG/DL (ref 7.8–10.44)
CHLORIDE SERPL-SCNC: 104 MMOL/L (ref 98–107)
CO2 SERPL-SCNC: 22 MMOL/L (ref 23–31)
CREAT CL PREDICTED SERPL C-G-VRATE: 38 ML/MIN (ref 70–130)
EOSINOPHIL # BLD AUTO: 0.1 THOU/UL (ref 0–0.7)
EOSINOPHIL NFR BLD AUTO: 1.9 % (ref 0–10)
GLUCOSE SERPL-MCNC: 100 MG/DL (ref 83–110)
HGB BLD-MCNC: 8.8 G/DL (ref 12–16)
LYMPHOCYTES # BLD: 0.8 THOU/UL (ref 1.2–3.4)
LYMPHOCYTES NFR BLD AUTO: 13.2 % (ref 21–51)
MCH RBC QN AUTO: 35.7 PG (ref 27–31)
MCV RBC AUTO: 104 FL (ref 78–98)
MONOCYTES # BLD AUTO: 0.6 THOU/UL (ref 0.11–0.59)
MONOCYTES NFR BLD AUTO: 11 % (ref 0–10)
NEUTROPHILS # BLD AUTO: 4.2 THOU/UL (ref 1.4–6.5)
NEUTROPHILS NFR BLD AUTO: 73.4 % (ref 42–75)
PLATELET # BLD AUTO: 182 10X3/UL (ref 130–400)
POTASSIUM SERPL-SCNC: 3.4 MMOL/L (ref 3.5–5.1)
RBC # BLD AUTO: 2.45 MILL/UL (ref 4.2–5.4)
SODIUM SERPL-SCNC: 137 MMOL/L (ref 136–145)
WBC # BLD AUTO: 5.8 10X3/UL (ref 4.8–10.8)

## 2023-03-16 RX ADMIN — ALBUMIN HUMAN SCH GM: 250 SOLUTION INTRAVENOUS at 05:45

## 2023-03-16 RX ADMIN — ALBUMIN HUMAN SCH GM: 250 SOLUTION INTRAVENOUS at 18:39

## 2023-03-16 RX ADMIN — ALBUMIN HUMAN SCH GM: 250 SOLUTION INTRAVENOUS at 13:38

## 2023-03-16 RX ADMIN — ALBUMIN HUMAN SCH GM: 250 SOLUTION INTRAVENOUS at 00:03

## 2023-03-16 RX ADMIN — ASPIRIN 81 MG CHEWABLE TABLET SCH MG: 81 TABLET CHEWABLE at 10:27

## 2023-03-16 RX ADMIN — ALUMINUM ZIRCONIUM TRICHLOROHYDREX GLY SCH EACH: 0.2 STICK TOPICAL at 10:29

## 2023-03-17 LAB
ANION GAP SERPL CALC-SCNC: 14 MMOL/L (ref 10–20)
BASOPHILS # BLD AUTO: 0 THOU/UL (ref 0–0.2)
BASOPHILS NFR BLD AUTO: 0.1 % (ref 0–1)
BUN SERPL-MCNC: 34 MG/DL (ref 9.8–20.1)
CALCIUM SERPL-MCNC: 9.7 MG/DL (ref 7.8–10.44)
CHLORIDE SERPL-SCNC: 106 MMOL/L (ref 98–107)
CO2 SERPL-SCNC: 25 MMOL/L (ref 23–31)
CREAT CL PREDICTED SERPL C-G-VRATE: 39 ML/MIN (ref 70–130)
EOSINOPHIL # BLD AUTO: 0.2 THOU/UL (ref 0–0.7)
EOSINOPHIL NFR BLD AUTO: 2.8 % (ref 0–10)
GLUCOSE SERPL-MCNC: 93 MG/DL (ref 83–110)
HGB BLD-MCNC: 9.4 G/DL (ref 12–16)
LYMPHOCYTES # BLD: 0.7 THOU/UL (ref 1.2–3.4)
LYMPHOCYTES NFR BLD AUTO: 11.8 % (ref 21–51)
MCH RBC QN AUTO: 35.8 PG (ref 27–31)
MCV RBC AUTO: 105 FL (ref 78–98)
MONOCYTES # BLD AUTO: 0.6 THOU/UL (ref 0.11–0.59)
MONOCYTES NFR BLD AUTO: 11.3 % (ref 0–10)
NEUTROPHILS # BLD AUTO: 4.2 THOU/UL (ref 1.4–6.5)
NEUTROPHILS NFR BLD AUTO: 74.1 % (ref 42–75)
PLATELET # BLD AUTO: 177 10X3/UL (ref 130–400)
POTASSIUM SERPL-SCNC: 4.1 MMOL/L (ref 3.5–5.1)
RBC # BLD AUTO: 2.62 MILL/UL (ref 4.2–5.4)
SODIUM SERPL-SCNC: 141 MMOL/L (ref 136–145)
WBC # BLD AUTO: 5.6 10X3/UL (ref 4.8–10.8)

## 2023-03-17 RX ADMIN — ALBUMIN HUMAN SCH GM: 250 SOLUTION INTRAVENOUS at 00:24

## 2023-03-17 RX ADMIN — ALBUMIN HUMAN SCH GM: 250 SOLUTION INTRAVENOUS at 11:43

## 2023-03-17 RX ADMIN — ALBUMIN HUMAN SCH GM: 250 SOLUTION INTRAVENOUS at 18:45

## 2023-03-17 RX ADMIN — ALBUMIN HUMAN SCH GM: 250 SOLUTION INTRAVENOUS at 05:35

## 2023-03-17 RX ADMIN — ALUMINUM ZIRCONIUM TRICHLOROHYDREX GLY SCH EACH: 0.2 STICK TOPICAL at 10:13

## 2023-03-17 RX ADMIN — ASPIRIN 81 MG CHEWABLE TABLET SCH MG: 81 TABLET CHEWABLE at 10:11

## 2023-03-18 VITALS — DIASTOLIC BLOOD PRESSURE: 63 MMHG | TEMPERATURE: 97.8 F | SYSTOLIC BLOOD PRESSURE: 138 MMHG

## 2023-03-18 LAB
ANION GAP SERPL CALC-SCNC: 14 MMOL/L (ref 10–20)
BUN SERPL-MCNC: 39 MG/DL (ref 9.8–20.1)
CALCIUM SERPL-MCNC: 9.9 MG/DL (ref 7.8–10.44)
CHLORIDE SERPL-SCNC: 104 MMOL/L (ref 98–107)
CO2 SERPL-SCNC: 26 MMOL/L (ref 23–31)
CREAT CL PREDICTED SERPL C-G-VRATE: 37 ML/MIN (ref 70–130)
GLUCOSE SERPL-MCNC: 95 MG/DL (ref 83–110)
POTASSIUM SERPL-SCNC: 3.8 MMOL/L (ref 3.5–5.1)
SODIUM SERPL-SCNC: 140 MMOL/L (ref 136–145)

## 2023-03-18 RX ADMIN — ASPIRIN 81 MG CHEWABLE TABLET SCH MG: 81 TABLET CHEWABLE at 10:24

## 2023-03-18 RX ADMIN — ALBUMIN HUMAN SCH GM: 250 SOLUTION INTRAVENOUS at 00:06

## 2023-03-18 RX ADMIN — ALBUMIN HUMAN SCH GM: 250 SOLUTION INTRAVENOUS at 11:18

## 2023-03-18 RX ADMIN — ALUMINUM ZIRCONIUM TRICHLOROHYDREX GLY SCH EACH: 0.2 STICK TOPICAL at 10:25

## 2023-03-18 RX ADMIN — ALBUMIN HUMAN SCH GM: 250 SOLUTION INTRAVENOUS at 05:44

## 2023-05-02 ENCOUNTER — HOSPITAL ENCOUNTER (OUTPATIENT)
Dept: HOSPITAL 92 - CSHCT | Age: 78
Discharge: HOME | End: 2023-05-02
Attending: FAMILY MEDICINE
Payer: MEDICARE

## 2023-05-02 DIAGNOSIS — R10.33: Primary | ICD-10-CM

## 2023-05-02 DIAGNOSIS — N32.9: ICD-10-CM

## 2023-05-02 PROCEDURE — 74176 CT ABD & PELVIS W/O CONTRAST: CPT
